# Patient Record
Sex: FEMALE | Race: WHITE | Employment: OTHER | URBAN - NONMETROPOLITAN AREA
[De-identification: names, ages, dates, MRNs, and addresses within clinical notes are randomized per-mention and may not be internally consistent; named-entity substitution may affect disease eponyms.]

---

## 2017-02-13 ENCOUNTER — OFFICE VISIT (OUTPATIENT)
Dept: CARDIOLOGY | Age: 77
End: 2017-02-13
Payer: MEDICARE

## 2017-02-13 VITALS
WEIGHT: 140 LBS | DIASTOLIC BLOOD PRESSURE: 64 MMHG | SYSTOLIC BLOOD PRESSURE: 124 MMHG | BODY MASS INDEX: 27.48 KG/M2 | HEIGHT: 60 IN | HEART RATE: 84 BPM

## 2017-02-13 DIAGNOSIS — I51.9 LEFT VENTRICULAR DYSFUNCTION: ICD-10-CM

## 2017-02-13 DIAGNOSIS — I35.1 AORTIC VALVE INSUFFICIENCY, UNSPECIFIED ETIOLOGY: Primary | ICD-10-CM

## 2017-02-13 DIAGNOSIS — I10 ESSENTIAL HYPERTENSION: ICD-10-CM

## 2017-02-13 DIAGNOSIS — I34.0 MITRAL VALVE INSUFFICIENCY, UNSPECIFIED ETIOLOGY: ICD-10-CM

## 2017-02-13 PROCEDURE — 1123F ACP DISCUSS/DSCN MKR DOCD: CPT | Performed by: NURSE PRACTITIONER

## 2017-02-13 PROCEDURE — G8420 CALC BMI NORM PARAMETERS: HCPCS | Performed by: NURSE PRACTITIONER

## 2017-02-13 PROCEDURE — 1036F TOBACCO NON-USER: CPT | Performed by: NURSE PRACTITIONER

## 2017-02-13 PROCEDURE — 4040F PNEUMOC VAC/ADMIN/RCVD: CPT | Performed by: NURSE PRACTITIONER

## 2017-02-13 PROCEDURE — 1090F PRES/ABSN URINE INCON ASSESS: CPT | Performed by: NURSE PRACTITIONER

## 2017-02-13 PROCEDURE — 99213 OFFICE O/P EST LOW 20 MIN: CPT | Performed by: NURSE PRACTITIONER

## 2017-02-13 PROCEDURE — G8484 FLU IMMUNIZE NO ADMIN: HCPCS | Performed by: NURSE PRACTITIONER

## 2017-02-13 PROCEDURE — G8427 DOCREV CUR MEDS BY ELIG CLIN: HCPCS | Performed by: NURSE PRACTITIONER

## 2017-02-13 PROCEDURE — G8400 PT W/DXA NO RESULTS DOC: HCPCS | Performed by: NURSE PRACTITIONER

## 2017-02-13 RX ORDER — OMEPRAZOLE 40 MG/1
40 CAPSULE, DELAYED RELEASE ORAL DAILY
COMMUNITY
Start: 2017-01-19

## 2017-08-14 ENCOUNTER — TELEPHONE (OUTPATIENT)
Dept: CARDIOLOGY | Age: 77
End: 2017-08-14

## 2017-08-14 ENCOUNTER — OFFICE VISIT (OUTPATIENT)
Dept: CARDIOLOGY | Age: 77
End: 2017-08-14
Payer: MEDICARE

## 2017-08-14 VITALS
HEIGHT: 60 IN | DIASTOLIC BLOOD PRESSURE: 78 MMHG | BODY MASS INDEX: 28.86 KG/M2 | SYSTOLIC BLOOD PRESSURE: 134 MMHG | WEIGHT: 147 LBS | HEART RATE: 88 BPM

## 2017-08-14 DIAGNOSIS — I34.0 MITRAL VALVE INSUFFICIENCY, UNSPECIFIED ETIOLOGY: Primary | ICD-10-CM

## 2017-08-14 DIAGNOSIS — I34.0 MODERATE MITRAL REGURGITATION: ICD-10-CM

## 2017-08-14 DIAGNOSIS — R06.09 DYSPNEA ON EXERTION: ICD-10-CM

## 2017-08-14 DIAGNOSIS — I07.1 MODERATE TRICUSPID REGURGITATION: ICD-10-CM

## 2017-08-14 DIAGNOSIS — I34.0 MITRAL VALVE INSUFFICIENCY, UNSPECIFIED ETIOLOGY: ICD-10-CM

## 2017-08-14 DIAGNOSIS — I10 ESSENTIAL HYPERTENSION: ICD-10-CM

## 2017-08-14 DIAGNOSIS — I35.1 MODERATE AORTIC INSUFFICIENCY: ICD-10-CM

## 2017-08-14 DIAGNOSIS — I38 VALVULAR HEART DISEASE: ICD-10-CM

## 2017-08-14 LAB
ANION GAP SERPL CALCULATED.3IONS-SCNC: 11 MMOL/L (ref 7–19)
BUN BLDV-MCNC: 15 MG/DL (ref 8–23)
CALCIUM SERPL-MCNC: 9 MG/DL (ref 8.8–10.2)
CHLORIDE BLD-SCNC: 108 MMOL/L (ref 98–111)
CO2: 25 MMOL/L (ref 22–29)
CREAT SERPL-MCNC: 0.5 MG/DL (ref 0.5–0.9)
GFR NON-AFRICAN AMERICAN: >60
GLUCOSE BLD-MCNC: 91 MG/DL (ref 74–109)
POTASSIUM SERPL-SCNC: 4.7 MMOL/L (ref 3.5–5)
PRO-BNP: 1045 PG/ML (ref 0–1800)
SODIUM BLD-SCNC: 144 MMOL/L (ref 136–145)

## 2017-08-14 PROCEDURE — G8419 CALC BMI OUT NRM PARAM NOF/U: HCPCS | Performed by: INTERNAL MEDICINE

## 2017-08-14 PROCEDURE — 99213 OFFICE O/P EST LOW 20 MIN: CPT | Performed by: INTERNAL MEDICINE

## 2017-08-14 PROCEDURE — 1123F ACP DISCUSS/DSCN MKR DOCD: CPT | Performed by: INTERNAL MEDICINE

## 2017-08-14 PROCEDURE — 1036F TOBACCO NON-USER: CPT | Performed by: INTERNAL MEDICINE

## 2017-08-14 PROCEDURE — 1090F PRES/ABSN URINE INCON ASSESS: CPT | Performed by: INTERNAL MEDICINE

## 2017-08-14 PROCEDURE — G8400 PT W/DXA NO RESULTS DOC: HCPCS | Performed by: INTERNAL MEDICINE

## 2017-08-14 PROCEDURE — G8427 DOCREV CUR MEDS BY ELIG CLIN: HCPCS | Performed by: INTERNAL MEDICINE

## 2017-08-14 PROCEDURE — 4040F PNEUMOC VAC/ADMIN/RCVD: CPT | Performed by: INTERNAL MEDICINE

## 2017-08-14 RX ORDER — TENOFOVIR DISOPROXIL FUMARATE 300 MG
300 TABLET ORAL DAILY
COMMUNITY
Start: 2017-07-17

## 2017-08-18 ENCOUNTER — HOSPITAL ENCOUNTER (OUTPATIENT)
Dept: NON INVASIVE DIAGNOSTICS | Age: 77
Discharge: HOME | End: 2017-08-18
Payer: MEDICARE

## 2017-08-18 DIAGNOSIS — I35.1 MODERATE AORTIC INSUFFICIENCY: ICD-10-CM

## 2017-08-18 DIAGNOSIS — I34.0 MODERATE MITRAL REGURGITATION: ICD-10-CM

## 2017-08-18 DIAGNOSIS — I07.1 MODERATE TRICUSPID REGURGITATION: ICD-10-CM

## 2017-08-18 DIAGNOSIS — I34.0 MITRAL VALVE INSUFFICIENCY, UNSPECIFIED ETIOLOGY: ICD-10-CM

## 2017-08-18 DIAGNOSIS — I38 VALVULAR HEART DISEASE: ICD-10-CM

## 2017-08-18 PROCEDURE — 93306 TTE W/DOPPLER COMPLETE: CPT

## 2017-08-29 ENCOUNTER — OFFICE VISIT (OUTPATIENT)
Dept: CARDIOLOGY | Age: 77
End: 2017-08-29
Payer: MEDICARE

## 2017-08-29 VITALS
WEIGHT: 145 LBS | HEART RATE: 80 BPM | DIASTOLIC BLOOD PRESSURE: 60 MMHG | BODY MASS INDEX: 28.47 KG/M2 | HEIGHT: 60 IN | SYSTOLIC BLOOD PRESSURE: 118 MMHG

## 2017-08-29 DIAGNOSIS — I10 ESSENTIAL HYPERTENSION: ICD-10-CM

## 2017-08-29 DIAGNOSIS — I51.89 DIASTOLIC DYSFUNCTION: ICD-10-CM

## 2017-08-29 DIAGNOSIS — I51.9 LEFT VENTRICULAR DYSFUNCTION: ICD-10-CM

## 2017-08-29 DIAGNOSIS — I35.1 MODERATE AORTIC INSUFFICIENCY: ICD-10-CM

## 2017-08-29 DIAGNOSIS — I34.0 MITRAL VALVE INSUFFICIENCY, UNSPECIFIED ETIOLOGY: ICD-10-CM

## 2017-08-29 DIAGNOSIS — R06.02 SOB (SHORTNESS OF BREATH): Primary | ICD-10-CM

## 2017-08-29 DIAGNOSIS — I50.42 CHRONIC COMBINED SYSTOLIC AND DIASTOLIC CHF (CONGESTIVE HEART FAILURE) (HCC): ICD-10-CM

## 2017-08-29 PROCEDURE — G8419 CALC BMI OUT NRM PARAM NOF/U: HCPCS | Performed by: NURSE PRACTITIONER

## 2017-08-29 PROCEDURE — 1123F ACP DISCUSS/DSCN MKR DOCD: CPT | Performed by: NURSE PRACTITIONER

## 2017-08-29 PROCEDURE — G8427 DOCREV CUR MEDS BY ELIG CLIN: HCPCS | Performed by: NURSE PRACTITIONER

## 2017-08-29 PROCEDURE — 4040F PNEUMOC VAC/ADMIN/RCVD: CPT | Performed by: NURSE PRACTITIONER

## 2017-08-29 PROCEDURE — 99214 OFFICE O/P EST MOD 30 MIN: CPT | Performed by: NURSE PRACTITIONER

## 2017-08-29 PROCEDURE — G8400 PT W/DXA NO RESULTS DOC: HCPCS | Performed by: NURSE PRACTITIONER

## 2017-08-29 PROCEDURE — 1036F TOBACCO NON-USER: CPT | Performed by: NURSE PRACTITIONER

## 2017-08-29 PROCEDURE — 1090F PRES/ABSN URINE INCON ASSESS: CPT | Performed by: NURSE PRACTITIONER

## 2017-08-29 PROCEDURE — 93000 ELECTROCARDIOGRAM COMPLETE: CPT | Performed by: NURSE PRACTITIONER

## 2017-08-29 RX ORDER — CARVEDILOL 3.12 MG/1
3.12 TABLET ORAL 2 TIMES DAILY WITH MEALS
Qty: 60 TABLET | Refills: 5 | Status: SHIPPED | OUTPATIENT
Start: 2017-08-29 | End: 2017-09-20 | Stop reason: SDUPTHER

## 2017-08-29 RX ORDER — FUROSEMIDE 80 MG
80 TABLET ORAL DAILY
COMMUNITY
Start: 2017-08-15 | End: 2017-09-20 | Stop reason: DRUGHIGH

## 2017-09-20 ENCOUNTER — HOSPITAL ENCOUNTER (OUTPATIENT)
Dept: GENERAL RADIOLOGY | Age: 77
Discharge: HOME | End: 2017-09-20
Payer: MEDICARE

## 2017-09-20 ENCOUNTER — OFFICE VISIT (OUTPATIENT)
Dept: CARDIOLOGY | Age: 77
End: 2017-09-20
Payer: MEDICARE

## 2017-09-20 VITALS
DIASTOLIC BLOOD PRESSURE: 72 MMHG | HEART RATE: 70 BPM | BODY MASS INDEX: 28.27 KG/M2 | HEIGHT: 60 IN | WEIGHT: 144 LBS | SYSTOLIC BLOOD PRESSURE: 130 MMHG

## 2017-09-20 DIAGNOSIS — R09.89 LUNG CRACKLES: ICD-10-CM

## 2017-09-20 DIAGNOSIS — I10 ESSENTIAL HYPERTENSION: Primary | ICD-10-CM

## 2017-09-20 DIAGNOSIS — R06.09 DOE (DYSPNEA ON EXERTION): ICD-10-CM

## 2017-09-20 DIAGNOSIS — I50.42 CHRONIC COMBINED SYSTOLIC AND DIASTOLIC CHF (CONGESTIVE HEART FAILURE) (HCC): ICD-10-CM

## 2017-09-20 LAB
ANION GAP SERPL CALCULATED.3IONS-SCNC: 15 MMOL/L (ref 7–19)
BASOPHILS ABSOLUTE: 0 K/UL (ref 0–0.2)
BASOPHILS RELATIVE PERCENT: 0.2 % (ref 0–1)
BUN BLDV-MCNC: 15 MG/DL (ref 8–23)
CALCIUM SERPL-MCNC: 9.3 MG/DL (ref 8.8–10.2)
CHLORIDE BLD-SCNC: 104 MMOL/L (ref 98–111)
CO2: 25 MMOL/L (ref 22–29)
CREAT SERPL-MCNC: 0.5 MG/DL (ref 0.5–0.9)
EOSINOPHILS ABSOLUTE: 0.1 K/UL (ref 0–0.6)
EOSINOPHILS RELATIVE PERCENT: 1.1 % (ref 0–5)
GFR NON-AFRICAN AMERICAN: >60
GLUCOSE BLD-MCNC: 97 MG/DL (ref 74–109)
HCT VFR BLD CALC: 38 % (ref 37–47)
HEMOGLOBIN: 12.3 G/DL (ref 12–16)
LYMPHOCYTES ABSOLUTE: 1.2 K/UL (ref 1.1–4.5)
LYMPHOCYTES RELATIVE PERCENT: 26.5 % (ref 20–40)
MCH RBC QN AUTO: 31.6 PG (ref 27–31)
MCHC RBC AUTO-ENTMCNC: 32.4 G/DL (ref 33–37)
MCV RBC AUTO: 97.7 FL (ref 81–99)
MONOCYTES ABSOLUTE: 0.5 K/UL (ref 0–0.9)
MONOCYTES RELATIVE PERCENT: 11.9 % (ref 0–10)
NEUTROPHILS ABSOLUTE: 2.6 K/UL (ref 1.5–7.5)
NEUTROPHILS RELATIVE PERCENT: 59.6 % (ref 50–65)
PDW BLD-RTO: 14.2 % (ref 11.5–14.5)
PLATELET # BLD: 80 K/UL (ref 130–400)
PMV BLD AUTO: 12.2 FL (ref 9.4–12.3)
POTASSIUM SERPL-SCNC: 4 MMOL/L (ref 3.5–5)
PRO-BNP: 612 PG/ML (ref 0–1800)
RBC # BLD: 3.89 M/UL (ref 4.2–5.4)
SODIUM BLD-SCNC: 144 MMOL/L (ref 136–145)
WBC # BLD: 4.4 K/UL (ref 4.8–10.8)

## 2017-09-20 PROCEDURE — G8427 DOCREV CUR MEDS BY ELIG CLIN: HCPCS | Performed by: INTERNAL MEDICINE

## 2017-09-20 PROCEDURE — 71020 XR CHEST STANDARD TWO VW: CPT

## 2017-09-20 PROCEDURE — 99214 OFFICE O/P EST MOD 30 MIN: CPT | Performed by: INTERNAL MEDICINE

## 2017-09-20 PROCEDURE — G8400 PT W/DXA NO RESULTS DOC: HCPCS | Performed by: INTERNAL MEDICINE

## 2017-09-20 PROCEDURE — 4040F PNEUMOC VAC/ADMIN/RCVD: CPT | Performed by: INTERNAL MEDICINE

## 2017-09-20 PROCEDURE — 93000 ELECTROCARDIOGRAM COMPLETE: CPT | Performed by: INTERNAL MEDICINE

## 2017-09-20 PROCEDURE — G8419 CALC BMI OUT NRM PARAM NOF/U: HCPCS | Performed by: INTERNAL MEDICINE

## 2017-09-20 PROCEDURE — 1090F PRES/ABSN URINE INCON ASSESS: CPT | Performed by: INTERNAL MEDICINE

## 2017-09-20 PROCEDURE — 1123F ACP DISCUSS/DSCN MKR DOCD: CPT | Performed by: INTERNAL MEDICINE

## 2017-09-20 PROCEDURE — 1036F TOBACCO NON-USER: CPT | Performed by: INTERNAL MEDICINE

## 2017-09-20 RX ORDER — CARVEDILOL 6.25 MG/1
6.25 TABLET ORAL 2 TIMES DAILY WITH MEALS
Qty: 30 TABLET | Refills: 5 | Status: SHIPPED | OUTPATIENT
Start: 2017-09-20 | End: 2018-01-08 | Stop reason: SDUPTHER

## 2017-09-20 RX ORDER — FUROSEMIDE 40 MG/1
40 TABLET ORAL DAILY
COMMUNITY
End: 2022-09-26

## 2017-09-20 ASSESSMENT — ENCOUNTER SYMPTOMS: SHORTNESS OF BREATH: 1

## 2017-11-01 ENCOUNTER — OFFICE VISIT (OUTPATIENT)
Dept: CARDIOLOGY | Age: 77
End: 2017-11-01
Payer: MEDICARE

## 2017-11-01 VITALS
SYSTOLIC BLOOD PRESSURE: 124 MMHG | WEIGHT: 145 LBS | HEART RATE: 64 BPM | BODY MASS INDEX: 28.47 KG/M2 | HEIGHT: 60 IN | DIASTOLIC BLOOD PRESSURE: 64 MMHG

## 2017-11-01 DIAGNOSIS — I10 ESSENTIAL HYPERTENSION: Primary | ICD-10-CM

## 2017-11-01 DIAGNOSIS — I34.0 MODERATE MITRAL REGURGITATION: ICD-10-CM

## 2017-11-01 DIAGNOSIS — I35.1 NONRHEUMATIC AORTIC VALVE INSUFFICIENCY: ICD-10-CM

## 2017-11-01 DIAGNOSIS — I07.1 MODERATE TRICUSPID REGURGITATION: ICD-10-CM

## 2017-11-01 DIAGNOSIS — I50.42 CHRONIC COMBINED SYSTOLIC AND DIASTOLIC CHF (CONGESTIVE HEART FAILURE) (HCC): ICD-10-CM

## 2017-11-01 PROCEDURE — G8400 PT W/DXA NO RESULTS DOC: HCPCS | Performed by: NURSE PRACTITIONER

## 2017-11-01 PROCEDURE — 1036F TOBACCO NON-USER: CPT | Performed by: NURSE PRACTITIONER

## 2017-11-01 PROCEDURE — 93000 ELECTROCARDIOGRAM COMPLETE: CPT | Performed by: NURSE PRACTITIONER

## 2017-11-01 PROCEDURE — G8427 DOCREV CUR MEDS BY ELIG CLIN: HCPCS | Performed by: NURSE PRACTITIONER

## 2017-11-01 PROCEDURE — 1090F PRES/ABSN URINE INCON ASSESS: CPT | Performed by: NURSE PRACTITIONER

## 2017-11-01 PROCEDURE — 4040F PNEUMOC VAC/ADMIN/RCVD: CPT | Performed by: NURSE PRACTITIONER

## 2017-11-01 PROCEDURE — G8419 CALC BMI OUT NRM PARAM NOF/U: HCPCS | Performed by: NURSE PRACTITIONER

## 2017-11-01 PROCEDURE — G8484 FLU IMMUNIZE NO ADMIN: HCPCS | Performed by: NURSE PRACTITIONER

## 2017-11-01 PROCEDURE — 1123F ACP DISCUSS/DSCN MKR DOCD: CPT | Performed by: NURSE PRACTITIONER

## 2017-11-01 PROCEDURE — 99213 OFFICE O/P EST LOW 20 MIN: CPT | Performed by: NURSE PRACTITIONER

## 2017-11-01 NOTE — PROGRESS NOTES
Constitutional: Negative for fever, chills, diaphoresis, activity change, appetite change, fatigue and unexpected weight change. Eyes: Negative for photophobia, pain, redness and visual disturbance. Respiratory: Negative for apnea, cough, chest tightness, +improved shortness of breath, wheezing and stridor. Cardiovascular: Negative for chest pain, palpitations and + LUE swelling. Gastrointestinal: Negative for abdominal distention. Genitourinary: Negative for dysuria, urgency and frequency. Musculoskeletal: Negative for myalgias, arthralgias and gait problem. Skin: Negative for color change, pallor, rash and wound. Neurological: Negative for dizziness, tremors, speech difficulty, weakness and numbness. Hematological: Does not bruise/bleed easily. Psychiatric/Behavioral: Negative. Past Medical History:   Diagnosis Date    Aortic insufficiency 12/04/13    Mild to moderate aortic insufficency.  Carcinoid tumor of ovary     [facility]     Diabetes ([facility]     Essential hypertension 8/29/2017    H/O: hysterectomy     Hx of breast cancer     History of bilateral breast cancer.  Hypertension     Incisional hernia with obstruction     with mesh placement    Moderate aortic insufficiency 02/17/2016    Moderate mitral regurgitation 02/17/2016    Moderate to severe mitral regurgitation.  Moderate tricuspid regurgitation 02/17/2016    Ovarian cyst     Pelvic mass     Radiation     Skin cancer        Past Surgical History:   Procedure Laterality Date    BREAST SURGERY      CARDIAC CATHETERIZATION  12/4/13  [facility]    EF 60%    HERNIA REPAIR      HYSTERECTOMY, TOTAL ABDOMINAL  12/2003    [facility] (negative for cancer). Family History   Problem Relation Age of Onset   Gin Barekai Cancer Son      brain tumor    Heart Disease Mother     Diabetes Mother     Heart Disease Brother        Social History     Social History    Marital status:       Spouse name: N/A   Gin Reynolds Number of children: N/A    Years of education: N/A     Occupational History    Not on file. Social History Main Topics    Smoking status: Never Smoker    Smokeless tobacco: Not on file    Alcohol use No    Drug use: No    Sexual activity: Not on file     Other Topics Concern    Not on file     Social History Narrative    No narrative on file       No Known Allergies      Current Outpatient Prescriptions:     furosemide (LASIX) 40 MG tablet, Take 40 mg by mouth daily, Disp: , Rfl:     carvedilol (COREG) 6.25 MG tablet, Take 1 tablet by mouth 2 times daily (with meals), Disp: 30 tablet, Rfl: 5    VIREAD 300 MG tablet, Take 300 mg by mouth daily , Disp: , Rfl:     omeprazole (PRILOSEC) 40 MG delayed release capsule, , Disp: , Rfl:     losartan (COZAAR) 100 MG tablet, Take 100 mg by mouth daily. , Disp: , Rfl:     HYDROcodone-acetaminophen (NORCO)  MG per tablet, Take 1 tablet by mouth every 8 hours as needed , Disp: , Rfl:     amitriptyline (ELAVIL) 25 MG tablet, Take 25 mg by mouth nightly as needed. , Disp: , Rfl:     cholestyramine (QUESTRAN) 4 G packet, Take 4 g by mouth daily. , Disp: , Rfl:     PE:  Vitals:    11/01/17 0948   BP: 124/64   Pulse: 64       Estimated body mass index is 28.32 kg/m² as calculated from the following:    Height as of this encounter: 5' (1.524 m). Weight as of this encounter: 145 lb (65.8 kg). Constitutional: She is oriented to person, place, and time. She appears well-developed and well-nourished in no acute distress. Head: Normocephalic and atraumatic. Neck:  Neck supple without JVD present. Cardiovascular: Normal rate, regular rhythm, normal heart sounds. 2/6 TASNEEM ascultated. No gallop and no friction rub.  no carotid bruits. LUE peripheral edema. Pulmonary/Chest:  Lungs clear to auscultation bilaterally without evidence of respiratory distress. She without wheezes. She without rales or ronchi. Musculoskeletal: Normal range of motion.   Gait is normal no assitive device. Neurological: She is alert and oriented to person, place, and time. Skin: Skin is warm and dry without rash or pallor. Psychiatric: She has a normal mood and affect. Her behavior is normal. Thought content normal.     Lab Results   Component Value Date    CREATININE 0.5 09/20/2017    CREATININE 0.5 08/14/2017    HGB 12.3 09/20/2017    PROBNP 612 09/20/2017    PROBNP 1,045 08/14/2017       ECG 11/01/17  Normal sinus rhythm, Poor R wave progression and 72 BPM    TTE   Summary   Moderately thickened mitral valve with moderately reduced leaflet   mobility. No evidence of mitral valve stenosis, moderate to severe mitral   regurgitation.   The aortic valve is trileaflet, moderately thickened & calcified with good   leaflet separation.   Moderate aortic regurgitation is noted.   Left ventricular size is normal .   Mild concentric left ventricular hypertrophy.   Moderate to severely decreased ejection fraction.      Signature      ----------------------------------------------------------------   Electronically signed by Varghese Herrera MD(Interpreting   physician) on 08/18/2017 03:07 PM      Cath    Assessment    1. Essential hypertension    2. Moderate tricuspid regurgitation    3. Moderate mitral regurgitation    4. Chronic combined systolic and diastolic CHF (congestive heart failure) ([address]    5. Nonrheumatic aortic valve insufficiency          Plan:    Continue current medications as prescribed. Stable Cardiovascular status    Disposition - RTC in 3 months with Dr. Anya Peacock for Valve follow up or sooner if needed  She will need a repeat Echo in June-Aug 2018    Please do not hesitate to contact me for any questions or concerns.     Sincerely yours,    FARZAD Daniels

## 2018-01-08 DIAGNOSIS — I50.42 CHRONIC COMBINED SYSTOLIC AND DIASTOLIC CHF (CONGESTIVE HEART FAILURE) (HCC): ICD-10-CM

## 2018-01-08 DIAGNOSIS — R06.09 DOE (DYSPNEA ON EXERTION): ICD-10-CM

## 2018-01-08 DIAGNOSIS — R09.89 LUNG CRACKLES: ICD-10-CM

## 2018-01-08 RX ORDER — CARVEDILOL 6.25 MG/1
TABLET ORAL
Qty: 30 TABLET | Refills: 5 | Status: SHIPPED | OUTPATIENT
Start: 2018-01-08 | End: 2018-04-02 | Stop reason: DRUGHIGH

## 2018-02-06 ENCOUNTER — OFFICE VISIT (OUTPATIENT)
Dept: CARDIOLOGY | Age: 78
End: 2018-02-06
Payer: MEDICARE

## 2018-02-06 VITALS
BODY MASS INDEX: 29.45 KG/M2 | WEIGHT: 150 LBS | HEART RATE: 74 BPM | DIASTOLIC BLOOD PRESSURE: 70 MMHG | SYSTOLIC BLOOD PRESSURE: 130 MMHG | HEIGHT: 60 IN

## 2018-02-06 DIAGNOSIS — R06.02 SHORTNESS OF BREATH: ICD-10-CM

## 2018-02-06 DIAGNOSIS — I51.89 DIASTOLIC DYSFUNCTION: ICD-10-CM

## 2018-02-06 DIAGNOSIS — I35.1 NONRHEUMATIC AORTIC VALVE INSUFFICIENCY: Primary | ICD-10-CM

## 2018-02-06 DIAGNOSIS — I51.9 LEFT VENTRICULAR DYSFUNCTION: ICD-10-CM

## 2018-02-06 DIAGNOSIS — I34.0 MODERATE MITRAL REGURGITATION: ICD-10-CM

## 2018-02-06 DIAGNOSIS — R06.02 SOB (SHORTNESS OF BREATH): ICD-10-CM

## 2018-02-06 DIAGNOSIS — I50.22 CHRONIC SYSTOLIC HEART FAILURE (HCC): ICD-10-CM

## 2018-02-06 DIAGNOSIS — I50.42 CHRONIC COMBINED SYSTOLIC AND DIASTOLIC CHF (CONGESTIVE HEART FAILURE) (HCC): ICD-10-CM

## 2018-02-06 DIAGNOSIS — I07.1 MODERATE TRICUSPID REGURGITATION: ICD-10-CM

## 2018-02-06 PROCEDURE — G8400 PT W/DXA NO RESULTS DOC: HCPCS | Performed by: NURSE PRACTITIONER

## 2018-02-06 PROCEDURE — G8419 CALC BMI OUT NRM PARAM NOF/U: HCPCS | Performed by: NURSE PRACTITIONER

## 2018-02-06 PROCEDURE — 93000 ELECTROCARDIOGRAM COMPLETE: CPT | Performed by: NURSE PRACTITIONER

## 2018-02-06 PROCEDURE — 1090F PRES/ABSN URINE INCON ASSESS: CPT | Performed by: NURSE PRACTITIONER

## 2018-02-06 PROCEDURE — G8484 FLU IMMUNIZE NO ADMIN: HCPCS | Performed by: NURSE PRACTITIONER

## 2018-02-06 PROCEDURE — 99213 OFFICE O/P EST LOW 20 MIN: CPT | Performed by: NURSE PRACTITIONER

## 2018-02-06 PROCEDURE — 1123F ACP DISCUSS/DSCN MKR DOCD: CPT | Performed by: NURSE PRACTITIONER

## 2018-02-06 PROCEDURE — 1036F TOBACCO NON-USER: CPT | Performed by: NURSE PRACTITIONER

## 2018-02-06 PROCEDURE — 4040F PNEUMOC VAC/ADMIN/RCVD: CPT | Performed by: NURSE PRACTITIONER

## 2018-02-06 PROCEDURE — G8427 DOCREV CUR MEDS BY ELIG CLIN: HCPCS | Performed by: NURSE PRACTITIONER

## 2018-02-06 NOTE — PROGRESS NOTES
Dear Jes Urban DO & Mimi Ornelas DO,    Thank you for allowing me to participate in the care of Ms. Jesus Hair. She presents today at the [address] [place] [address] in the [facility]. As you know, Ms. Dari Bond is a [de-identified] y.o. female with history of hypertension, hx of Ovarian Ca, Colon Ca, Breast Ca, Diabetes, TR, MR, aortic insufficiency, Diastolic Dysfunction, Left Ventricular Dysfunction and  Chronic combined systolic diastolic CHF who presents with the chief complaint of 3 month follow up. He is a patient of Dr. Ranjeet Urbina and Dr. Adriane Clifford. Congestive Heart Failure  Echo 2015 EF 30%, Echo Feb 2016 EF 35%, Cath 2013 free of occlusive disease, ECHO 8/2017 EF 25%, Mod-Severe MR, mod AR. Patient presents for re-evaluation of congestive heart failure. Patient's current complaints are dyspnea and fatigue. She denies chest pain, exertional chest pressure/discomfort, near-syncope, orthopnea, palpitations, paroxysmal nocturnal dyspnea and syncope. She states she is compliant most of the time with her medications. She states she is compliant most of the time with her diet. Last couple of weeks tired again. Breathless with just getting from office to car. Feels like her ROMERO/ SOA is worse. She has been weighing daily- has gained weight goes from 139-140 now 142. 3 months ago she stated her weights run from 139-144. Tries to watch low salt diet. She has been taking 80 mg of Lasix for the last few days. Hypertension  Patient is here for follow-up of elevated blood pressure. She is not exercising and is adherent to a low-salt diet. Blood pressure is well controlled at home. Cardiac symptoms: dyspnea, fatigue and lower extremity edema. Patient denies chest pain, exertional chest pressure/discomfort, irregular heart beat, near-syncope, orthopnea, palpitations, paroxysmal nocturnal dyspnea and syncope.  Cardiovascular risk factors: advanced age (older than 54 for men, 72 for women), diabetes mellitus, family history of premature cardiovascular disease and hypertension. Use of agents associated with hypertension: none. History of target organ damage: heart failure and peripheral artery disease. BS running a bit higher lately as well. She has has some medication adjustments with her PCP. She otherwise denies chest pain, PND, orthopnea, syncope or near syncope. She has no other complaints. Review of Systems    Constitutional: Negative for fever, chills, diaphoresis, activity change, appetite change, fatigue and unexpected weight change. Eyes: Negative for photophobia, pain, redness and visual disturbance. Respiratory: Negative for apnea, cough, chest tightness, +shortness of breath ROMERO, no wheezing and stridor. Cardiovascular: Negative for chest pain, palpitations and leg swelling. Gastrointestinal: Negative for abdominal distention. Genitourinary: Negative for dysuria, urgency and frequency. Musculoskeletal: Negative for myalgias, arthralgias and gait problem. Skin: Negative for color change, pallor, rash and wound. Neurological: Negative for dizziness, tremors, speech difficulty, weakness and numbness. Hematological: Does not bruise/bleed easily. Psychiatric/Behavioral: Negative. Past Medical History:   Diagnosis Date    Aortic insufficiency 12/04/13    Mild to moderate aortic insufficency.  Carcinoid tumor of ovary     [facility]     Diabetes ([facility]     Essential hypertension 8/29/2017    H/O: hysterectomy     Hepatitis B     Hx of breast cancer     History of bilateral breast cancer.  Hypertension     Incisional hernia with obstruction     with mesh placement    Moderate aortic insufficiency 02/17/2016    Moderate mitral regurgitation 02/17/2016    Moderate to severe mitral regurgitation.      Moderate tricuspid regurgitation 02/17/2016    Ovarian cyst     Pelvic mass     Radiation    

## 2018-02-06 NOTE — PATIENT INSTRUCTIONS
says 50%, it means that you will get at least 50% of all the sodium you need for the entire day in one serving. Choose products with low Percent Daily Values for sodium. · Be aware that sodium can come in forms other than salt, including monosodium glutamate (MSG), sodium citrate, and sodium bicarbonate (baking soda). MSG is often added to Asian food. You can sometimes ask for food without MSG or salt. Buy low-sodium foods  · Buy foods that are labeled \"unsalted\" (no salt added), \"sodium-free\" (less than 5 mg of sodium per serving), or \"low-sodium\" (less than 140 mg of sodium per serving). A food labeled \"light sodium\" has less than half of the full-sodium version of that food. Foods labeled \"reduced-sodium\" may still have too much sodium. · Buy fresh vegetables or plain, frozen vegetables. Buy low-sodium versions of canned vegetables, soups, and other canned goods. Prepare low-sodium meals  · Use less salt each day when cooking. Reducing salt in this way will help you adjust to the taste. Do not add salt after cooking. Take the salt shaker off the table. · Flavor your food with garlic, lemon juice, onion, vinegar, herbs, and spices instead of salt. Do not use soy sauce, steak sauce, onion salt, garlic salt, mustard, or ketchup on your food. · Make your own salad dressings, sauces, and ketchup without adding salt. · Use less salt (or none) when recipes call for it. You can often use half the salt a recipe calls for without losing flavor. Other dishes like rice, pasta, and grains do not need added salt. · Rinse canned vegetables. This removes some-but not all-of the salt. · Avoid water that has a naturally high sodium content or that has been treated with water softeners, which add sodium. Call your local water company to find out the sodium content of your water supply. If you buy bottled water, read the label and choose a sodium-free brand.   Avoid high-sodium foods, such as:  · Smoked, cured, salted, and

## 2018-02-19 ENCOUNTER — HOSPITAL ENCOUNTER (OUTPATIENT)
Dept: NON INVASIVE DIAGNOSTICS | Age: 78
Discharge: HOME | End: 2018-02-19
Payer: MEDICARE

## 2018-02-19 DIAGNOSIS — I34.0 MODERATE MITRAL REGURGITATION: ICD-10-CM

## 2018-02-19 DIAGNOSIS — I35.1 NONRHEUMATIC AORTIC VALVE INSUFFICIENCY: ICD-10-CM

## 2018-02-19 DIAGNOSIS — I50.22 CHRONIC SYSTOLIC HEART FAILURE (HCC): ICD-10-CM

## 2018-02-19 DIAGNOSIS — R06.02 SHORTNESS OF BREATH: ICD-10-CM

## 2018-02-19 LAB
LV EF: 58 %
LVEF MODALITY: NORMAL

## 2018-02-19 PROCEDURE — 93306 TTE W/DOPPLER COMPLETE: CPT

## 2018-02-23 DIAGNOSIS — R06.02 SOB (SHORTNESS OF BREATH): Primary | ICD-10-CM

## 2018-02-28 ENCOUNTER — HOSPITAL ENCOUNTER (OUTPATIENT)
Dept: PULMONOLOGY | Age: 78
Discharge: HOME | End: 2018-02-28
Payer: MEDICARE

## 2018-02-28 DIAGNOSIS — R06.02 SOB (SHORTNESS OF BREATH): ICD-10-CM

## 2018-02-28 PROCEDURE — 94060 EVALUATION OF WHEEZING: CPT

## 2018-02-28 PROCEDURE — 94729 DIFFUSING CAPACITY: CPT

## 2018-02-28 PROCEDURE — 94726 PLETHYSMOGRAPHY LUNG VOLUMES: CPT

## 2018-02-28 RX ORDER — ALBUTEROL SULFATE 2.5 MG/3ML
2.5 SOLUTION RESPIRATORY (INHALATION) EVERY 6 HOURS PRN
Status: DISCONTINUED | OUTPATIENT
Start: 2018-02-28 | End: 2018-03-02 | Stop reason: HOSPADM

## 2018-04-02 ENCOUNTER — OFFICE VISIT (OUTPATIENT)
Dept: CARDIOLOGY | Age: 78
End: 2018-04-02
Payer: MEDICARE

## 2018-04-02 VITALS
WEIGHT: 146 LBS | DIASTOLIC BLOOD PRESSURE: 76 MMHG | HEART RATE: 77 BPM | HEIGHT: 60 IN | BODY MASS INDEX: 28.66 KG/M2 | SYSTOLIC BLOOD PRESSURE: 130 MMHG

## 2018-04-02 DIAGNOSIS — I35.1 MODERATE AORTIC INSUFFICIENCY: ICD-10-CM

## 2018-04-02 DIAGNOSIS — I38 VALVULAR HEART DISEASE: ICD-10-CM

## 2018-04-02 DIAGNOSIS — I34.0 MODERATE MITRAL REGURGITATION: ICD-10-CM

## 2018-04-02 DIAGNOSIS — I50.42 CHRONIC COMBINED SYSTOLIC AND DIASTOLIC CONGESTIVE HEART FAILURE (HCC): Primary | ICD-10-CM

## 2018-04-02 PROCEDURE — G8427 DOCREV CUR MEDS BY ELIG CLIN: HCPCS | Performed by: INTERNAL MEDICINE

## 2018-04-02 PROCEDURE — G8400 PT W/DXA NO RESULTS DOC: HCPCS | Performed by: INTERNAL MEDICINE

## 2018-04-02 PROCEDURE — G8419 CALC BMI OUT NRM PARAM NOF/U: HCPCS | Performed by: INTERNAL MEDICINE

## 2018-04-02 PROCEDURE — 4040F PNEUMOC VAC/ADMIN/RCVD: CPT | Performed by: INTERNAL MEDICINE

## 2018-04-02 PROCEDURE — 1090F PRES/ABSN URINE INCON ASSESS: CPT | Performed by: INTERNAL MEDICINE

## 2018-04-02 PROCEDURE — 1036F TOBACCO NON-USER: CPT | Performed by: INTERNAL MEDICINE

## 2018-04-02 PROCEDURE — 99213 OFFICE O/P EST LOW 20 MIN: CPT | Performed by: INTERNAL MEDICINE

## 2018-04-02 PROCEDURE — 1123F ACP DISCUSS/DSCN MKR DOCD: CPT | Performed by: INTERNAL MEDICINE

## 2018-04-02 RX ORDER — CARVEDILOL 25 MG/1
25 TABLET ORAL 2 TIMES DAILY WITH MEALS
COMMUNITY
End: 2018-04-05 | Stop reason: SDUPTHER

## 2018-04-02 RX ORDER — CLONIDINE HYDROCHLORIDE 0.1 MG/1
0.1 TABLET ORAL NIGHTLY
COMMUNITY
End: 2018-04-05 | Stop reason: SDUPTHER

## 2018-04-05 RX ORDER — CARVEDILOL 25 MG/1
25 TABLET ORAL 2 TIMES DAILY WITH MEALS
Qty: 60 TABLET | Refills: 3 | Status: SHIPPED | OUTPATIENT
Start: 2018-04-05 | End: 2018-07-05 | Stop reason: SDUPTHER

## 2018-04-05 RX ORDER — CLONIDINE HYDROCHLORIDE 0.1 MG/1
0.1 TABLET ORAL NIGHTLY
Qty: 60 TABLET | Refills: 3 | Status: SHIPPED | OUTPATIENT
Start: 2018-04-05

## 2018-05-07 ENCOUNTER — OFFICE VISIT (OUTPATIENT)
Dept: CARDIOLOGY | Age: 78
End: 2018-05-07
Payer: MEDICARE

## 2018-05-07 VITALS
HEART RATE: 64 BPM | DIASTOLIC BLOOD PRESSURE: 68 MMHG | BODY MASS INDEX: 28.86 KG/M2 | SYSTOLIC BLOOD PRESSURE: 128 MMHG | WEIGHT: 147 LBS | HEIGHT: 60 IN

## 2018-05-07 DIAGNOSIS — I34.0 NON-RHEUMATIC MITRAL REGURGITATION: ICD-10-CM

## 2018-05-07 DIAGNOSIS — I10 ESSENTIAL HYPERTENSION: Primary | ICD-10-CM

## 2018-05-07 DIAGNOSIS — I35.1 MODERATE AORTIC INSUFFICIENCY: ICD-10-CM

## 2018-05-07 PROCEDURE — 1036F TOBACCO NON-USER: CPT | Performed by: INTERNAL MEDICINE

## 2018-05-07 PROCEDURE — G8427 DOCREV CUR MEDS BY ELIG CLIN: HCPCS | Performed by: INTERNAL MEDICINE

## 2018-05-07 PROCEDURE — 1090F PRES/ABSN URINE INCON ASSESS: CPT | Performed by: INTERNAL MEDICINE

## 2018-05-07 PROCEDURE — 4040F PNEUMOC VAC/ADMIN/RCVD: CPT | Performed by: INTERNAL MEDICINE

## 2018-05-07 PROCEDURE — G8419 CALC BMI OUT NRM PARAM NOF/U: HCPCS | Performed by: INTERNAL MEDICINE

## 2018-05-07 PROCEDURE — 1123F ACP DISCUSS/DSCN MKR DOCD: CPT | Performed by: INTERNAL MEDICINE

## 2018-05-07 PROCEDURE — 99214 OFFICE O/P EST MOD 30 MIN: CPT | Performed by: INTERNAL MEDICINE

## 2018-05-07 PROCEDURE — 93000 ELECTROCARDIOGRAM COMPLETE: CPT | Performed by: INTERNAL MEDICINE

## 2018-05-07 PROCEDURE — G8400 PT W/DXA NO RESULTS DOC: HCPCS | Performed by: INTERNAL MEDICINE

## 2018-07-06 RX ORDER — CARVEDILOL 25 MG/1
TABLET ORAL
Qty: 60 TABLET | Refills: 5 | Status: SHIPPED | OUTPATIENT
Start: 2018-07-06

## 2018-10-04 ENCOUNTER — OFFICE VISIT (OUTPATIENT)
Dept: CARDIOLOGY | Age: 78
End: 2018-10-04
Payer: MEDICARE

## 2018-10-04 VITALS
WEIGHT: 147 LBS | HEART RATE: 60 BPM | HEIGHT: 60 IN | BODY MASS INDEX: 28.86 KG/M2 | DIASTOLIC BLOOD PRESSURE: 64 MMHG | SYSTOLIC BLOOD PRESSURE: 122 MMHG

## 2018-10-04 DIAGNOSIS — I10 ESSENTIAL HYPERTENSION: Primary | ICD-10-CM

## 2018-10-04 DIAGNOSIS — I51.9 LEFT VENTRICULAR DYSFUNCTION: ICD-10-CM

## 2018-10-04 DIAGNOSIS — I51.89 DIASTOLIC DYSFUNCTION: ICD-10-CM

## 2018-10-04 DIAGNOSIS — I34.0 MODERATE MITRAL REGURGITATION: ICD-10-CM

## 2018-10-04 DIAGNOSIS — I35.1 MODERATE AORTIC INSUFFICIENCY: ICD-10-CM

## 2018-10-04 PROCEDURE — 99214 OFFICE O/P EST MOD 30 MIN: CPT | Performed by: NURSE PRACTITIONER

## 2018-10-04 PROCEDURE — G8419 CALC BMI OUT NRM PARAM NOF/U: HCPCS | Performed by: NURSE PRACTITIONER

## 2018-10-04 PROCEDURE — G8427 DOCREV CUR MEDS BY ELIG CLIN: HCPCS | Performed by: NURSE PRACTITIONER

## 2018-10-04 PROCEDURE — G8484 FLU IMMUNIZE NO ADMIN: HCPCS | Performed by: NURSE PRACTITIONER

## 2018-10-04 PROCEDURE — 1090F PRES/ABSN URINE INCON ASSESS: CPT | Performed by: NURSE PRACTITIONER

## 2018-10-04 PROCEDURE — 1101F PT FALLS ASSESS-DOCD LE1/YR: CPT | Performed by: NURSE PRACTITIONER

## 2018-10-04 NOTE — PROGRESS NOTES
[facility], [place]. [address], [facility], [address]  [phone] office  [phone] fax      OFFICE VISIT:  10/4/2018    Maria Del Carmen Holman - : 1940    Reason For Visit:  Gabby Reddy is a [de-identified] y.o. female who is here for 6 Month Follow-Up (Patient is having SOB and a lot of swelling in left arm.) and Hypertension    The patient presents today for cardiology follow up. Overall, the patient is doing well from a cardiac standpoint without symptoms to suggest myocardial ischemia. The patient reports feeling more SOA the past week. No cough or fever. She reports weight fluctuates. Weight is stable at 147 per EPIC flowsheet. PFTs were done this past February which showed mid flows reduced with improvement post bronchodilator.  cath showed no occlusive CAD. The patient has a hx of left ventricular dysfunction with normalization of EF on 2018 echo. She is has been following with Dr. William Nicohls for moderate MR and AI. BP is well controlled on current regimen. The patient's PCP monitors cholesterol. Patient following with Dr. Roque Linda at this time for B12 injections. Patient reports hx of breast cancer x 2, colon and ovarian cancer. Subjective  Gabby Reddy denies exertional chest pain, orthopnea, paroxysmal nocturnal dyspnea, syncope, presyncope, sustained arrhythmia and fatigue. The patient denies numbness or weakness to suggest cerebrovascular accident or transient ischemic attack. + more SOA for one week. +chronic LUE secondary to lymphedema.  + mild ankle edema.     Maria Del Carmen Holman has the following history as recorded in [facility]:    Patient Active Problem List   Diagnosis Code    [facility] C80.1    Pelvic mass R19.00    Hypertension I10    Aortic insufficiency I35.1    Left ventricular dysfunction I51.9    Moderate mitral regurgitation I34.0    Moderate aortic insufficiency I35.1    Moderate tricuspid regurgitation I07.1   

## 2019-03-13 ENCOUNTER — OFFICE VISIT (OUTPATIENT)
Dept: CARDIOLOGY | Age: 79
End: 2019-03-13
Payer: MEDICARE

## 2019-03-13 ENCOUNTER — HOSPITAL ENCOUNTER (OUTPATIENT)
Dept: NON INVASIVE DIAGNOSTICS | Age: 79
Discharge: HOME | End: 2019-03-13
Payer: MEDICARE

## 2019-03-13 VITALS
DIASTOLIC BLOOD PRESSURE: 58 MMHG | BODY MASS INDEX: 29.64 KG/M2 | SYSTOLIC BLOOD PRESSURE: 96 MMHG | WEIGHT: 151 LBS | HEIGHT: 60 IN | HEART RATE: 64 BPM

## 2019-03-13 DIAGNOSIS — R06.09 DOE (DYSPNEA ON EXERTION): Primary | ICD-10-CM

## 2019-03-13 DIAGNOSIS — I50.42 CHRONIC COMBINED SYSTOLIC AND DIASTOLIC HEART FAILURE (HCC): ICD-10-CM

## 2019-03-13 DIAGNOSIS — I10 ESSENTIAL HYPERTENSION: ICD-10-CM

## 2019-03-13 DIAGNOSIS — I34.0 MITRAL VALVE INSUFFICIENCY, UNSPECIFIED ETIOLOGY: ICD-10-CM

## 2019-03-13 DIAGNOSIS — I35.1 AORTIC VALVE INSUFFICIENCY, ETIOLOGY OF CARDIAC VALVE DISEASE UNSPECIFIED: ICD-10-CM

## 2019-03-13 DIAGNOSIS — I35.1 MODERATE AORTIC INSUFFICIENCY: ICD-10-CM

## 2019-03-13 DIAGNOSIS — I34.0 NON-RHEUMATIC MITRAL REGURGITATION: ICD-10-CM

## 2019-03-13 LAB
LV EF: 50 %
LVEF MODALITY: NORMAL

## 2019-03-13 PROCEDURE — 1090F PRES/ABSN URINE INCON ASSESS: CPT | Performed by: NURSE PRACTITIONER

## 2019-03-13 PROCEDURE — G8400 PT W/DXA NO RESULTS DOC: HCPCS | Performed by: NURSE PRACTITIONER

## 2019-03-13 PROCEDURE — 1036F TOBACCO NON-USER: CPT | Performed by: NURSE PRACTITIONER

## 2019-03-13 PROCEDURE — 1123F ACP DISCUSS/DSCN MKR DOCD: CPT | Performed by: NURSE PRACTITIONER

## 2019-03-13 PROCEDURE — 1101F PT FALLS ASSESS-DOCD LE1/YR: CPT | Performed by: NURSE PRACTITIONER

## 2019-03-13 PROCEDURE — 99214 OFFICE O/P EST MOD 30 MIN: CPT | Performed by: NURSE PRACTITIONER

## 2019-03-13 PROCEDURE — G8419 CALC BMI OUT NRM PARAM NOF/U: HCPCS | Performed by: NURSE PRACTITIONER

## 2019-03-13 PROCEDURE — 4040F PNEUMOC VAC/ADMIN/RCVD: CPT | Performed by: NURSE PRACTITIONER

## 2019-03-13 PROCEDURE — 93306 TTE W/DOPPLER COMPLETE: CPT

## 2019-03-13 PROCEDURE — G8484 FLU IMMUNIZE NO ADMIN: HCPCS | Performed by: NURSE PRACTITIONER

## 2019-03-13 PROCEDURE — G8427 DOCREV CUR MEDS BY ELIG CLIN: HCPCS | Performed by: NURSE PRACTITIONER

## 2019-03-13 RX ORDER — FERROUS SULFATE 325(65) MG
325 TABLET ORAL 2 TIMES DAILY
COMMUNITY
End: 2022-09-26

## 2019-03-13 RX ORDER — FUROSEMIDE 80 MG
80 TABLET ORAL 2 TIMES DAILY
COMMUNITY

## 2019-09-18 ENCOUNTER — OFFICE VISIT (OUTPATIENT)
Dept: CARDIOLOGY | Age: 79
End: 2019-09-18
Payer: MEDICARE

## 2019-09-18 VITALS
DIASTOLIC BLOOD PRESSURE: 64 MMHG | HEIGHT: 60 IN | HEART RATE: 60 BPM | SYSTOLIC BLOOD PRESSURE: 100 MMHG | WEIGHT: 144 LBS | BODY MASS INDEX: 28.27 KG/M2

## 2019-09-18 DIAGNOSIS — I35.1 MODERATE AORTIC REGURGITATION: ICD-10-CM

## 2019-09-18 DIAGNOSIS — I51.89 GRADE III DIASTOLIC DYSFUNCTION: ICD-10-CM

## 2019-09-18 DIAGNOSIS — I10 ESSENTIAL HYPERTENSION: Primary | ICD-10-CM

## 2019-09-18 DIAGNOSIS — I34.0 MODERATE MITRAL REGURGITATION: ICD-10-CM

## 2019-09-18 DIAGNOSIS — I10 ESSENTIAL HYPERTENSION: ICD-10-CM

## 2019-09-18 LAB
ANION GAP SERPL CALCULATED.3IONS-SCNC: 8 MMOL/L (ref 7–19)
BUN BLDV-MCNC: 18 MG/DL (ref 8–23)
CALCIUM SERPL-MCNC: 9.7 MG/DL (ref 8.8–10.2)
CHLORIDE BLD-SCNC: 106 MMOL/L (ref 98–111)
CO2: 31 MMOL/L (ref 22–29)
CREAT SERPL-MCNC: 0.6 MG/DL (ref 0.5–0.9)
GFR NON-AFRICAN AMERICAN: >60
GLUCOSE BLD-MCNC: 141 MG/DL (ref 74–109)
POTASSIUM SERPL-SCNC: 3.8 MMOL/L (ref 3.5–5)
SODIUM BLD-SCNC: 145 MMOL/L (ref 136–145)

## 2019-09-18 PROCEDURE — 99214 OFFICE O/P EST MOD 30 MIN: CPT | Performed by: NURSE PRACTITIONER

## 2019-09-18 PROCEDURE — 4040F PNEUMOC VAC/ADMIN/RCVD: CPT | Performed by: NURSE PRACTITIONER

## 2019-09-18 PROCEDURE — 93000 ELECTROCARDIOGRAM COMPLETE: CPT | Performed by: NURSE PRACTITIONER

## 2019-09-18 PROCEDURE — G8400 PT W/DXA NO RESULTS DOC: HCPCS | Performed by: NURSE PRACTITIONER

## 2019-09-18 PROCEDURE — G8419 CALC BMI OUT NRM PARAM NOF/U: HCPCS | Performed by: NURSE PRACTITIONER

## 2019-09-18 PROCEDURE — G8427 DOCREV CUR MEDS BY ELIG CLIN: HCPCS | Performed by: NURSE PRACTITIONER

## 2019-09-18 PROCEDURE — 1036F TOBACCO NON-USER: CPT | Performed by: NURSE PRACTITIONER

## 2019-09-18 PROCEDURE — 1090F PRES/ABSN URINE INCON ASSESS: CPT | Performed by: NURSE PRACTITIONER

## 2019-09-18 PROCEDURE — 1123F ACP DISCUSS/DSCN MKR DOCD: CPT | Performed by: NURSE PRACTITIONER

## 2019-09-18 NOTE — PROGRESS NOTES
Dear Jes Matthew, DO & [facility], DO,    Thank you for allowing me to participate in the care of Ms. Andre Alejandra. She presents today at the [address] [place] [address] in the [facility]. As you know, Ms. Lady Clifton is a 78 y.o. female with history of hypertension, hyperlipidemia, diabetes, mitral regurgitation, aortic regurgitation, and combined chronic systolic and diastolic heart failure who presents with the chief complaint of follow-up for chronic cardiac conditions. She is a patient of Dr. Glynn Zamarripa. Since last seen, she has been stable from a cardiac standpoint. She has shortness of air that is chronic and stable. She denies any chest pain. She is recently been treated for a cold and has been on antibiotics and steroids. She states that her PCP told her to take her [de-identified] of Lasix every day and has been taking for the past week. She normally monitors her weights at home and takes 80 mg of Lasix when her weight is up. She denies any fast or slow heart rhythms. she is sleeping on 1 pillow at night. she is not waking gasping for air. she denies any swelling. she has been compliant with her medications. her BP has been controlled. PCP follows labs and lipids. She otherwise denies chest pain, PND, orthopnea, syncope or near syncope. She has no other complaints. Review of Systems   Constitutional: Negative for fever, chills, diaphoresis, activity change, appetite change, fatigue and unexpected weight change. Eyes: Negative for photophobia, pain, redness and visual disturbance. Respiratory: Positive for SOA(, stable). Negative for apnea, cough, chest tightness, shortness of breath, wheezing and stridor. Cardiovascular: Negative for chest pain, palpitations and leg swelling. Gastrointestinal: Negative for abdominal distention. Genitourinary: Negative for dysuria, urgency and frequency.    Musculoskeletal: Negative for myalgias, arthralgias and gait problem. Skin: Negative for color change, pallor, rash and wound. Neurological: Negative for dizziness, tremors, speech difficulty, weakness and numbness. Hematological: Does not bruise/bleed easily. Psychiatric/Behavioral: Negative. Past Medical History:   Diagnosis Date    Aortic insufficiency 12/04/13    Mild to moderate aortic insufficency.  Carcinoid tumor of ovary     Chronic combined systolic and diastolic congestive heart failure (HCC)     [facility]     Diabetes ([facility]     Essential hypertension 8/29/2017    H/O: hysterectomy     Hepatitis B     Hx of breast cancer     History of bilateral breast cancer.  Hypertension     Incisional hernia with obstruction     with mesh placement    Moderate aortic insufficiency 02/17/2016    Moderate mitral regurgitation 02/17/2016    Moderate to severe mitral regurgitation.  Moderate tricuspid regurgitation 02/17/2016    Ovarian cyst     Pelvic mass     Radiation     Skin cancer        Past Surgical History:   Procedure Laterality Date    BREAST SURGERY      C[facility]    EF 60%    HERNIA REPAIR      HYSTERECTOMY, TOTAL ABDOMINAL  12/2003    [facility] (negative for cancer). Family History   Problem Relation Age of Onset    Heart Disease Mother     Diabetes Mother     Heart Disease Brother     Cancer Son         brain tumor       Social History     Socioeconomic History    Marital status:       Spouse name: Not on file    Number of children: Not on file    Years of education: Not on file    Highest education level: Not on file   Occupational History    Not on file   Social Needs    Financial resource strain: Not on file    Food insecurity:     Worry: Not on file     Inability: Not on file    Transportation needs:     Medical: Not on file     Non-medical: Not on file   Tobacco Use    Smoking status: Never Smoker    Smokeless tobacco: Never Used   Substance and Sexual Activity    Alcohol use: No    Drug use: No    Sexual activity: Not on file   Lifestyle    Physical activity:     Days per week: Not on file     Minutes per session: Not on file    Stress: Not on file   Relationships    Social connections:     Talks on phone: Not on file     Gets together: Not on file     Attends Christian service: Not on file     Active member of club or organization: Not on file     Attends meetings of clubs or organizations: Not on file     Relationship status: Not on file    Intimate partner violence:     Fear of current or ex partner: Not on file     Emotionally abused: Not on file     Physically abused: Not on file     Forced sexual activity: Not on file   Other Topics Concern    Not on file   Social History Narrative    Not on file       No Known Allergies      Current Outpatient Medications:     furosemide (LASIX) 80 MG tablet, Take 80 mg by mouth as needed (Only takes when she gains extra weight), Disp: , Rfl:     Alendronate Sodium (FOSAMAX PO), Take by mouth every 14 days, Disp: , Rfl:     Cyanocobalamin (VITAMIN B-12 PO), Take by mouth daily, Disp: , Rfl:     ferrous sulfate 325 (65 Fe) MG tablet, Take 325 mg by mouth 2 times daily, Disp: , Rfl:     carvedilol (COREG) 25 MG tablet, TAKE ONE TABLET BY MOUTH TWICE A DAY WITH MEALS, Disp: 60 tablet, Rfl: 5    cloNIDine (CATAPRES) 0.1 MG tablet, Take 1 tablet by mouth nightly, Disp: 60 tablet, Rfl: 3    furosemide (LASIX) 40 MG tablet, Take 40 mg by mouth daily, Disp: , Rfl:     VIREAD 300 MG tablet, Take 300 mg by mouth daily , Disp: , Rfl:     omeprazole (PRILOSEC) 40 MG delayed release capsule, 40 mg daily , Disp: , Rfl:     HYDROcodone-acetaminophen (NORCO)  MG per tablet, Take 1 tablet by mouth every 8 hours as needed , Disp: , Rfl:     amitriptyline (ELAVIL) 25 MG tablet, Take 25 mg by mouth nightly as needed. , Disp: , Rfl:     cholestyramine (QUESTRAN) 4 G packet, Take 4 g by mouth daily. , Disp: , Rfl:     PE:  Vitals:    09/18/19 0957   BP: 100/64   Pulse: 60       Estimated body mass index is 28.12 kg/m² as calculated from the following:    Height as of this encounter: 5' (1.524 m). Weight as of this encounter: 144 lb (65.3 kg). Constitutional: She is oriented to person, place, and time. She appears well-developed and well-nourished in no acute distress. Head: Normocephalic and atraumatic. Neck:  Neck supple without JVD present. Cardiovascular: Normal rate, Normal rhythm, normal heart sounds. 1/6 murmur ascultated. No gallop and no friction rub. No carotid bruits. No peripheral edema. Pulmonary/Chest:  Lungs clear to auscultation bilaterally without evidence of respiratory distress. She without wheezes. She without rales or ronchi. Musculoskeletal: Normal range of motion. Gait is normal.   Neurological: She is alert and oriented to person, place, and time. Skin: Skin is warm and dry without rash or pallor. Psychiatric: She has a normal mood and affect. Her behavior is normal. Thought content normal.     Lab Results   Component Value Date    CREATININE 0.5 09/20/2017    CREATININE 0.5 08/14/2017    HGB 12.3 09/20/2017    PROBNP 612 09/20/2017    PROBNP 1,045 08/14/2017       EKG 9/18/19  Sinus rhythm, nonspecific ST depression, HR 60 bpm    TTE-3/13/19   Mildly increased left ventricular size with preserved LV function and an   estimated ejection fraction of approximately 50%.   No evidence of left ventricular mass or thrombus noted.   Grade III diastolic dysfunction    Assessment, Recommendations, & Plan:  78 y.o. female with Chronic systolic and diastolic heart failure, HTN, Moderate aortic regurgitation, & Moderate Mitral regurgitation    HF-his echo showed an EF of 50%. She does have grade 3 diastolic dysfunction. She is on Lasix daily and takes an extra one for weight gain and swelling.  Will check BMP to see what potassium is with taking 80 mg Lasix daily    HTN- Controlled, No

## 2019-10-07 ENCOUNTER — TELEPHONE (OUTPATIENT)
Dept: NEUROSURGERY | Age: 79
End: 2019-10-07

## 2020-03-18 ENCOUNTER — OFFICE VISIT (OUTPATIENT)
Dept: CARDIOLOGY | Age: 80
End: 2020-03-18
Payer: MEDICARE

## 2020-03-18 VITALS
HEIGHT: 60 IN | WEIGHT: 147 LBS | SYSTOLIC BLOOD PRESSURE: 118 MMHG | BODY MASS INDEX: 28.86 KG/M2 | DIASTOLIC BLOOD PRESSURE: 78 MMHG | HEART RATE: 66 BPM

## 2020-03-18 PROCEDURE — G8427 DOCREV CUR MEDS BY ELIG CLIN: HCPCS | Performed by: NURSE PRACTITIONER

## 2020-03-18 PROCEDURE — 1123F ACP DISCUSS/DSCN MKR DOCD: CPT | Performed by: NURSE PRACTITIONER

## 2020-03-18 PROCEDURE — 1036F TOBACCO NON-USER: CPT | Performed by: NURSE PRACTITIONER

## 2020-03-18 PROCEDURE — 4040F PNEUMOC VAC/ADMIN/RCVD: CPT | Performed by: NURSE PRACTITIONER

## 2020-03-18 PROCEDURE — G8400 PT W/DXA NO RESULTS DOC: HCPCS | Performed by: NURSE PRACTITIONER

## 2020-03-18 PROCEDURE — G8417 CALC BMI ABV UP PARAM F/U: HCPCS | Performed by: NURSE PRACTITIONER

## 2020-03-18 PROCEDURE — 1090F PRES/ABSN URINE INCON ASSESS: CPT | Performed by: NURSE PRACTITIONER

## 2020-03-18 PROCEDURE — 99214 OFFICE O/P EST MOD 30 MIN: CPT | Performed by: NURSE PRACTITIONER

## 2020-03-18 PROCEDURE — G8484 FLU IMMUNIZE NO ADMIN: HCPCS | Performed by: NURSE PRACTITIONER

## 2020-03-18 NOTE — PROGRESS NOTES
activity     Days per week: Not on file     Minutes per session: Not on file    Stress: Not on file   Relationships    Social connections     Talks on phone: Not on file     Gets together: Not on file     Attends Tenriism service: Not on file     Active member of club or organization: Not on file     Attends meetings of clubs or organizations: Not on file     Relationship status: Not on file    Intimate partner violence     Fear of current or ex partner: Not on file     Emotionally abused: Not on file     Physically abused: Not on file     Forced sexual activity: Not on file   Other Topics Concern    Not on file   Social History Narrative    Not on file       No Known Allergies      Current Outpatient Medications:     furosemide (LASIX) 80 MG tablet, Take 80 mg by mouth as needed (Only takes when she gains extra weight), Disp: , Rfl:     Alendronate Sodium (FOSAMAX PO), Take by mouth every 14 days, Disp: , Rfl:     ferrous sulfate 325 (65 Fe) MG tablet, Take 325 mg by mouth 2 times daily, Disp: , Rfl:     carvedilol (COREG) 25 MG tablet, TAKE ONE TABLET BY MOUTH TWICE A DAY WITH MEALS, Disp: 60 tablet, Rfl: 5    cloNIDine (CATAPRES) 0.1 MG tablet, Take 1 tablet by mouth nightly, Disp: 60 tablet, Rfl: 3    furosemide (LASIX) 40 MG tablet, Take 40 mg by mouth daily, Disp: , Rfl:     VIREAD 300 MG tablet, Take 300 mg by mouth daily , Disp: , Rfl:     omeprazole (PRILOSEC) 40 MG delayed release capsule, 40 mg daily , Disp: , Rfl:     HYDROcodone-acetaminophen (NORCO)  MG per tablet, Take 1 tablet by mouth every 8 hours as needed , Disp: , Rfl:     amitriptyline (ELAVIL) 25 MG tablet, Take 25 mg by mouth nightly as needed. , Disp: , Rfl:     cholestyramine (QUESTRAN) 4 G packet, Take 4 g by mouth daily. , Disp: , Rfl:     Cyanocobalamin (VITAMIN B-12 PO), Take by mouth daily, Disp: , Rfl:     PE:  Vitals:    03/18/20 0955   BP: 118/78   Pulse: 66       Estimated body mass index is 28.71 kg/m² as

## 2020-03-18 NOTE — PATIENT INSTRUCTIONS
are not sleeping well. Shortness of breath wakes you up at night. You need extra pillows. Your doctor's name: ____________________________________________________________  Your doctor's contact information: _________________________________________________  Red zone. This is an emergency. Call  911. You have symptoms of sudden heart failure, such as:  · You have severe trouble breathing. · You cough up pink, foamy mucus. · You have a new irregular or fast heartbeat. You have symptoms of a heart attack. These may include:  · Chest pain or pressure, or a strange feeling in the chest.  · Sweating. · Shortness of breath. · Nausea or vomiting. · Pain, pressure, or a strange feeling in the back, neck, jaw, or upper belly or in one or both shoulders or arms. · Lightheadedness or sudden weakness. · A fast or irregular heartbeat. If you have symptoms of a heart attack: After you call  911, the  may tell you to chew 1 adult-strength or 2 to 4 low-dose aspirin. Wait for an ambulance. Do not try to drive yourself. Follow-up care is a key part of your treatment and safety. Be sure to make and go to all appointments, and call your doctor if you are having problems. It's also a good idea to know your test results and keep a list of the medicines you take. Where can you learn more? Go to https://[employer]. org and sign in to your lemonade.uk account. Enter T174 in the SafeTacMag box to learn more about \"Learning About Heart Failure Zones. \"     If you do not have an account, please click on the \"Sign Up Now\" link. Current as of: December 15, 2019Content Version: 12.4  © 3998-2683 Healthwise, Incorporated. Care instructions adapted under license by [facility] ([facility]). If you have questions about a medical condition or this instruction, always ask your healthcare professional. [place] [address] any warranty or liability for your use of this information.          Patient good.  · Avoid colds and flu. Get a pneumococcal vaccine shot. If you have had one before, ask your doctor if you need another dose. Get a flu vaccine every year. · Take care of your teeth and gums. Get regular dental checkups. Good dental health is important because bacteria can spread from infected teeth and gums to the heart valves. · If you have an artificial valve, you may need to take antibiotics before you have certain dental or surgical procedures. When should you call for help? Call 911 anytime you think you may need emergency care. For example, call if:    · You have severe trouble breathing.     · You cough up pink, foamy mucus and you have trouble breathing.     · You have symptoms of a heart attack. These may include:  ? Chest pain or pressure, or a strange feeling in the chest.  ? Sweating. ? Shortness of breath. ? Nausea or vomiting. ? Pain, pressure, or a strange feeling in the back, neck, jaw, or upper belly or in one or both shoulders or arms. ? Lightheadedness or sudden weakness. ? A fast or irregular heartbeat. After you call  911, the  may tell you to chew 1 adult-strength or 2 to 4 low-dose aspirin. Wait for an ambulance. Do not try to drive yourself.     · You have symptoms of a stroke. These may include:  ? Sudden numbness, tingling, weakness, or loss of movement in your face, arm, or leg, especially on only one side of your body. ? Sudden vision changes. ? Sudden trouble speaking. ? Sudden confusion or trouble understanding simple statements. ? Sudden problems with walking or balance. ? A sudden, severe headache that is different from past headaches.     · You passed out (lost consciousness).    Call your doctor now or seek immediate medical care if:    · You have new or increased shortness of breath.     · You are dizzy or lightheaded, or you feel like you may faint.     · You have sudden weight gain, such as more than 2 to 3 pounds in a day or 5 pounds in a week.

## 2020-05-26 ENCOUNTER — HOSPITAL ENCOUNTER (OUTPATIENT)
Dept: NON INVASIVE DIAGNOSTICS | Age: 80
Discharge: HOME | End: 2020-05-26
Payer: MEDICARE

## 2020-05-26 LAB
LV EF: 58 %
LVEF MODALITY: NORMAL

## 2020-05-26 PROCEDURE — 93306 TTE W/DOPPLER COMPLETE: CPT

## 2020-08-20 ENCOUNTER — TELEPHONE (OUTPATIENT)
Dept: CARDIOLOGY | Age: 80
End: 2020-08-20

## 2020-08-20 NOTE — TELEPHONE ENCOUNTER
Date: TBD    Cardiologist: Johnny    Procedure: Cataract Surgery    Surgeon: [facility]    Last Office Visit: 3/18/20  Reason for office visit and medical concerns addressed at this office visit: VHD, dm, htn    Testing Performed and Date of Service:  5/26/20 Echo  Mildly thickened mitral valve with normal leaflet mobility. No evidence of   mitral valve stenosis, mild to moderate mitral regurgitation. The aortic valve is trileaflet, mildly thickened & calcified with good   leaflet separation. No evidence of aortic stenosis; mild aortic regurgitation. Tricuspid valve is structurally normal.   Mild tricuspid regurgitation with estimated RVSP of 25 mm Hg. Normal left ventricular size with preserved LV function and an estimated   ejection fraction of approximately 55-60%. No evidence of left ventricular mass or thrombus noted. E/A flow reversal diastolic filling pattern consistent with Grade I   diastolic dysfunction. Mild left ventricular hypertrophy. RCRI = 0.4   METs 4    Current Medications: lasix, fosamax, iron, coreg, clonidine, viread, prilosec, amitriptyline, Carly Spry    Is the patient currently taking an anticoagulant?  If so, what is the diagnosis the patient has been given to warrant the need for the anticoagulant? none    Additional Notes: requesting caridac clearance prior to procedure

## 2020-09-21 ENCOUNTER — TELEPHONE (OUTPATIENT)
Dept: CARDIOLOGY | Age: 80
End: 2020-09-21

## 2020-09-21 NOTE — TELEPHONE ENCOUNTER
Kaylee Barber is overbooked on 09/24/2020. Called and unable to leave v/m for Pt.  Pt can reschedule to NP on and day and any time

## 2020-10-22 ENCOUNTER — OFFICE VISIT (OUTPATIENT)
Dept: CARDIOLOGY | Age: 80
End: 2020-10-22
Payer: MEDICARE

## 2020-10-22 VITALS
HEART RATE: 48 BPM | HEIGHT: 60 IN | BODY MASS INDEX: 28.86 KG/M2 | DIASTOLIC BLOOD PRESSURE: 70 MMHG | WEIGHT: 147 LBS | SYSTOLIC BLOOD PRESSURE: 128 MMHG

## 2020-10-22 PROCEDURE — G8417 CALC BMI ABV UP PARAM F/U: HCPCS | Performed by: INTERNAL MEDICINE

## 2020-10-22 PROCEDURE — G8484 FLU IMMUNIZE NO ADMIN: HCPCS | Performed by: INTERNAL MEDICINE

## 2020-10-22 PROCEDURE — 1090F PRES/ABSN URINE INCON ASSESS: CPT | Performed by: INTERNAL MEDICINE

## 2020-10-22 PROCEDURE — 99214 OFFICE O/P EST MOD 30 MIN: CPT | Performed by: INTERNAL MEDICINE

## 2020-10-22 PROCEDURE — 1036F TOBACCO NON-USER: CPT | Performed by: INTERNAL MEDICINE

## 2020-10-22 PROCEDURE — 4040F PNEUMOC VAC/ADMIN/RCVD: CPT | Performed by: INTERNAL MEDICINE

## 2020-10-22 PROCEDURE — G8427 DOCREV CUR MEDS BY ELIG CLIN: HCPCS | Performed by: INTERNAL MEDICINE

## 2020-10-22 PROCEDURE — G8400 PT W/DXA NO RESULTS DOC: HCPCS | Performed by: INTERNAL MEDICINE

## 2020-10-22 PROCEDURE — 1123F ACP DISCUSS/DSCN MKR DOCD: CPT | Performed by: INTERNAL MEDICINE

## 2020-10-22 ASSESSMENT — ENCOUNTER SYMPTOMS
GASTROINTESTINAL NEGATIVE: 1
EYES NEGATIVE: 1
RESPIRATORY NEGATIVE: 1
VOMITING: 0
SHORTNESS OF BREATH: 0
NAUSEA: 0
DIARRHEA: 0

## 2020-10-22 NOTE — PROGRESS NOTES
[facility] Progress Note                            Date:  10/22/2020  Patient: Liseth Sparrow  Age:  78 y.o., 1940      Reason for evaluation:         SUBJECTIVE:    Returns today follow-up assessment chief complaint is edema and lymphedema and chronic exertional dyspnea cannot walk but a short distance no definite anginal chest pain. She is not had stress testing or catheterization in recent years. Echocardiogram 5/26/2020 ejection fraction 55 to 51% grade 1 diastolic dysfunction mild to moderate MR mild TR RVSP 25 mmHg. Cardiac catheterization 12/4/2013 by Dr. Shellie Holliday mild to moderate aortic regurgitation mild mitral gravitation nonocclusive coronary artery disease. Review of Systems   Constitutional: Negative. Negative for chills, fever and unexpected weight change. HENT: Negative. Eyes: Negative. Respiratory: Negative. Negative for shortness of breath. Cardiovascular: Negative. Negative for chest pain. Gastrointestinal: Negative. Negative for diarrhea, nausea and vomiting. Endocrine: Negative. Genitourinary: Negative. Musculoskeletal: Negative. Skin: Negative. Neurological: Negative. All other systems reviewed and are negative. OBJECTIVE:     /70   Pulse (!) 48   Ht 5' (1.524 m)   Wt 147 lb (66.7 kg)   BMI 28.71 kg/m²     Labs:   CBC: No results for input(s): WBC, HGB, HCT, PLT in the last 72 hours. BMP:No results for input(s): NA, K, CO2, BUN, CREATININE, LABGLOM, GLUCOSE in the last 72 hours. BNP: No results for input(s): BNP in the last 72 hours. PT/INR: No results for input(s): PROTIME, INR in the last 72 hours. APTT:No results for input(s): APTT in the last 72 hours. CARDIAC ENZYMES:No results for input(s): CKTOTAL, CKMB, CKMBINDEX, TROPONINI in the last 72 hours. FASTING LIPID PANEL:No results found for: HDL, LDLDIRECT, LDLCALC, TRIG  LIVER PROFILE:No results for input(s): AST, ALT, LABALBU in the last 72 hours. Past Medical History:   Diagnosis Date    Aortic insufficiency 12/04/13    Mild to moderate aortic insufficency.  Carcinoid tumor of ovary     Chronic combined systolic and diastolic congestive heart failure (HCC)     Colon cancer ([address]     Diabetes [facility] [address]     Essential hypertension 8/29/2017    H/O: hysterectomy     Hepatitis B     Hx of breast cancer     History of bilateral breast cancer.  Hypertension     Incisional hernia with obstruction     with mesh placement    Moderate aortic insufficiency 02/17/2016    Moderate mitral regurgitation 02/17/2016    Moderate to severe mitral regurgitation.  Moderate tricuspid regurgitation 02/17/2016    Ovarian cyst     Pelvic mass     Radiation     Skin cancer      Past Surgical History:   Procedure Laterality Date    BREAST SURGERY      C[facility]    EF 60%    HERNIA REPAIR      HYSTERECTOMY, TOTAL ABDOMINAL  12/2003    [facility] (negative for cancer).      Family History   Problem Relation Age of Onset    Heart Disease Mother     Diabetes Mother     Heart Disease Brother     Cancer Son         brain tumor     No Known Allergies  Current Outpatient Medications   Medication Sig Dispense Refill    furosemide (LASIX) 80 MG tablet Take 80 mg by mouth as needed (Only takes when she gains extra weight)      Alendronate Sodium (FOSAMAX PO) Take by mouth every 14 days      Cyanocobalamin (VITAMIN B-12 PO) Take by mouth daily      ferrous sulfate 325 (65 Fe) MG tablet Take 325 mg by mouth 2 times daily      carvedilol (COREG) 25 MG tablet TAKE ONE TABLET BY MOUTH TWICE A DAY WITH MEALS 60 tablet 5    cloNIDine (CATAPRES) 0.1 MG tablet Take 1 tablet by mouth nightly 60 tablet 3    furosemide (LASIX) 40 MG tablet Take 40 mg by mouth daily      VIREAD 300 MG tablet Take 300 mg by mouth daily       omeprazole (PRILOSEC) 40 MG delayed release capsule 40 mg daily       HYDROcodone-acetaminophen (NORCO)  MG per tablet Take 1 tablet by mouth every 8 hours as needed       amitriptyline (ELAVIL) 25 MG tablet Take 25 mg by mouth nightly as needed.  cholestyramine (QUESTRAN) 4 G packet Take 4 g by mouth daily. No current facility-administered medications for this visit. Social History     Socioeconomic History    Marital status:      Spouse name: Not on file    Number of children: Not on file    Years of education: Not on file    Highest education level: Not on file   Occupational History    Not on file   Social Needs    Financial resource strain: Not on file    Food insecurity     Worry: Not on file     Inability: Not on file    Transportation needs     Medical: Not on file     Non-medical: Not on file   Tobacco Use    Smoking status: Never Smoker    Smokeless tobacco: Never Used   Substance and Sexual Activity    Alcohol use: No    Drug use: No    Sexual activity: Not on file   Lifestyle    Physical activity     Days per week: Not on file     Minutes per session: Not on file    Stress: Not on file   Relationships    Social connections     Talks on phone: Not on file     Gets together: Not on file     Attends Scientologist service: Not on file     Active member of club or organization: Not on file     Attends meetings of clubs or organizations: Not on file     Relationship status: Not on file    Intimate partner violence     Fear of current or ex partner: Not on file     Emotionally abused: Not on file     Physically abused: Not on file     Forced sexual activity: Not on file   Other Topics Concern    Not on file   Social History Narrative    Not on file       Physical Examination:  /70   Pulse (!) 48   Ht 5' (1.524 m)   Wt 147 lb (66.7 kg)   BMI 28.71 kg/m²   Physical Exam  Vitals signs reviewed. Constitutional:       Appearance: She is well-developed. Neck:      Vascular: No carotid bruit or JVD. Cardiovascular:      Rate and Rhythm: Normal rate and regular rhythm. Heart sounds: Normal heart sounds. No murmur. No friction rub. No gallop. Pulmonary:      Effort: Pulmonary effort is normal. No respiratory distress. Breath sounds: Normal breath sounds. No wheezing or rales. Abdominal:      General: There is no distension. Tenderness: There is no abdominal tenderness. Lymphadenopathy:      Cervical: No cervical adenopathy. Skin:     General: Skin is warm and dry. ASSESSMENT:     Diagnosis Orders   1. Chronic combined systolic and diastolic CHF (congestive heart failure) (HCC)  NM MYOCARDIAL SPECT REST EXERCISE OR RX   2. Left ventricular dysfunction     3. Moderate aortic insufficiency     4. Nonrheumatic mitral valve regurgitation     5. Essential hypertension     6. Dyspnea on exertion  NM MYOCARDIAL SPECT REST EXERCISE OR RX       PLAN:  Orders Placed This Encounter   Procedures    NM MYOCARDIAL SPECT REST EXERCISE OR RX     No orders of the defined types were placed in this encounter. 1. Continue present medications  2. Recommend Lexiscan pharmacologic stress test  3. Recommend follow-up assessment in 3 months    Return in about 3 months (around 1/22/2021) for return to Dr. Dante Kennedy only. Sinai Priest MD 10/22/2020 11:11 AM T    [facility]      Thisdictation was generated by voice recognition computer software. Although all attempts are made to edit the dictation for accuracy, there may be errors in the transcription that are not intended.

## 2020-10-22 NOTE — PATIENT INSTRUCTIONS
[place] at the [address] [facility] and [address] [facility] located on the first floor of [place] [address] through hospital main entrance and turn immediately to your left. Patient's contact number:  [phone] (home)      Surya Hetram Stress Test  Date: Oct 26 Monday 11:30    Lexiscan (regadenoson injection) is a prescription drug given through an IV line that increases blood flow through the arteries of the heart during a cardiac nuclear stress test.     There are two parts to a Lexiscan stress test: the rest portion and the exercise portion. For the rest portion, a radioactive tracer is injected into your arm through the IV. After 30 to 60 minutes, the process of imaging will begin. A nuclear camera will be placed on your chest area and images are taken for the next 15 to 20 minutes. For the exercise portion, a nurse will attach EKG electrodes to your chest to monitor your heart rate. The drug Leandrew Heys is administered to simulate stress on the heart. Your heart rhythm will then be monitored for the next few minutes. Your blood pressure will also be monitored throughout the exercise portion. Morris through the exercise portion, a second round of radioactive tracer is injected into your body. Your heart rate and EKG will be monitored for another few minutes after administering the drug. Test Preparation:     Bring a list of your current medications. Do not take any of your medications the morning of the test, but bring all morning medications with you as you will take them after the stress portion of the test is completed.  Do not eat Bananas 24 hours prior to test.     No caffeine 24 hours prior to the testing. This includes: coffee, pop/soda, chocolate, cold medications, etc.  Any product that might contain caffeine.  No nicotine or alcohol 12 hours prior to your test.    Nothing to eat or drink 6-8 hours prior to appointment time.   It is okay to drink small amounts of water during the four hours prior to the test.   Nitroglycerin patches must be taken off 1 hour before testing.  Wear comfortable clothing.  Please refrain from any strenuous exercise or activities the day before your test, or the day of your test.   The Nuclear Lexiscan Stress test takes about 2 ½ to 3 hours to complete. If for any reason you are unable to keep this appointment, please contact Outpatient Scheduling, [phone], as soon as possible to reschedule.

## 2020-10-23 ENCOUNTER — TELEPHONE (OUTPATIENT)
Dept: CARDIOLOGY | Age: 80
End: 2020-10-23

## 2020-10-26 ENCOUNTER — HOSPITAL ENCOUNTER (OUTPATIENT)
Dept: NUCLEAR MEDICINE | Age: 80
Discharge: HOME | End: 2020-10-28
Payer: MEDICARE

## 2020-10-26 PROCEDURE — A9500 TC99M SESTAMIBI: HCPCS | Performed by: INTERNAL MEDICINE

## 2020-10-26 PROCEDURE — 78452 HT MUSCLE IMAGE SPECT MULT: CPT

## 2020-10-26 PROCEDURE — 6360000002 HC RX W HCPCS: Performed by: INTERNAL MEDICINE

## 2020-10-26 PROCEDURE — 3430000000 HC RX DIAGNOSTIC RADIOPHARMACEUTICAL: Performed by: INTERNAL MEDICINE

## 2020-10-26 RX ADMIN — TETRAKIS(2-METHOXYISOBUTYLISOCYANIDE)COPPER(I) TETRAFLUOROBORATE 10 MILLICURIE: 1 INJECTION, POWDER, LYOPHILIZED, FOR SOLUTION INTRAVENOUS at 12:02

## 2020-10-26 RX ADMIN — REGADENOSON 0.4 MG: 0.08 INJECTION, SOLUTION INTRAVENOUS at 14:02

## 2020-10-26 RX ADMIN — TETRAKIS(2-METHOXYISOBUTYLISOCYANIDE)COPPER(I) TETRAFLUOROBORATE 30 MILLICURIE: 1 INJECTION, POWDER, LYOPHILIZED, FOR SOLUTION INTRAVENOUS at 12:02

## 2020-10-28 ENCOUNTER — TELEPHONE (OUTPATIENT)
Dept: CARDIOLOGY | Age: 80
End: 2020-10-28

## 2020-10-28 LAB
LV EF: 45 %
LVEF MODALITY: NORMAL

## 2020-11-02 ENCOUNTER — TELEPHONE (OUTPATIENT)
Dept: CARDIOLOGY | Age: 80
End: 2020-11-02

## 2020-11-02 NOTE — TELEPHONE ENCOUNTER
----- Message from Lu Bahena MD sent at 10/28/2020  4:43 PM CDT -----  Advise results consider cardiac catheterization especially if symptomatic

## 2020-11-02 NOTE — TELEPHONE ENCOUNTER
Spoke with patient says she is unsure if she wants to have this done or not. She will call me back if she wants to get this scheduled.

## 2021-01-25 ENCOUNTER — OFFICE VISIT (OUTPATIENT)
Dept: CARDIOLOGY CLINIC | Age: 81
End: 2021-01-25
Payer: MEDICARE

## 2021-01-25 VITALS
HEART RATE: 82 BPM | BODY MASS INDEX: 29.45 KG/M2 | SYSTOLIC BLOOD PRESSURE: 138 MMHG | WEIGHT: 150 LBS | HEIGHT: 60 IN | DIASTOLIC BLOOD PRESSURE: 70 MMHG

## 2021-01-25 DIAGNOSIS — I50.42 CHRONIC COMBINED SYSTOLIC AND DIASTOLIC CHF (CONGESTIVE HEART FAILURE) (HCC): Primary | ICD-10-CM

## 2021-01-25 DIAGNOSIS — I10 ESSENTIAL HYPERTENSION: ICD-10-CM

## 2021-01-25 DIAGNOSIS — I35.1 MODERATE AORTIC INSUFFICIENCY: ICD-10-CM

## 2021-01-25 DIAGNOSIS — I34.0 MODERATE MITRAL REGURGITATION: ICD-10-CM

## 2021-01-25 DIAGNOSIS — I51.9 LEFT VENTRICULAR DYSFUNCTION: ICD-10-CM

## 2021-01-25 DIAGNOSIS — I34.0 NONRHEUMATIC MITRAL VALVE REGURGITATION: ICD-10-CM

## 2021-01-25 DIAGNOSIS — I07.1 MODERATE TRICUSPID REGURGITATION: ICD-10-CM

## 2021-01-25 DIAGNOSIS — R06.02 SOB (SHORTNESS OF BREATH): ICD-10-CM

## 2021-01-25 PROCEDURE — 1090F PRES/ABSN URINE INCON ASSESS: CPT | Performed by: INTERNAL MEDICINE

## 2021-01-25 PROCEDURE — 4040F PNEUMOC VAC/ADMIN/RCVD: CPT | Performed by: INTERNAL MEDICINE

## 2021-01-25 PROCEDURE — 1036F TOBACCO NON-USER: CPT | Performed by: INTERNAL MEDICINE

## 2021-01-25 PROCEDURE — G8417 CALC BMI ABV UP PARAM F/U: HCPCS | Performed by: INTERNAL MEDICINE

## 2021-01-25 PROCEDURE — G8484 FLU IMMUNIZE NO ADMIN: HCPCS | Performed by: INTERNAL MEDICINE

## 2021-01-25 PROCEDURE — 99213 OFFICE O/P EST LOW 20 MIN: CPT | Performed by: INTERNAL MEDICINE

## 2021-01-25 PROCEDURE — 1123F ACP DISCUSS/DSCN MKR DOCD: CPT | Performed by: INTERNAL MEDICINE

## 2021-01-25 PROCEDURE — G8400 PT W/DXA NO RESULTS DOC: HCPCS | Performed by: INTERNAL MEDICINE

## 2021-01-25 PROCEDURE — G8427 DOCREV CUR MEDS BY ELIG CLIN: HCPCS | Performed by: INTERNAL MEDICINE

## 2021-01-25 ASSESSMENT — ENCOUNTER SYMPTOMS
RESPIRATORY NEGATIVE: 1
VOMITING: 0
NAUSEA: 0
SHORTNESS OF BREATH: 0
DIARRHEA: 0
GASTROINTESTINAL NEGATIVE: 1
EYES NEGATIVE: 1

## 2021-01-25 NOTE — PROGRESS NOTES
[facility] Progress Note                            Date:  1/25/2021  Patient: Hernando David  Age:  78 y.o., 1940      Reason for evaluation:         SUBJECTIVE:    Returns today for follow-up assessment overall doing well edema comes and goes but generally stable. Exertional dyspnea improved denies chest pain no new complaints or issues reported. Blood pressure 138 or 70 heart 82. Review of Systems   Constitutional: Negative. Negative for chills, fever and unexpected weight change. HENT: Negative. Eyes: Negative. Respiratory: Negative. Negative for shortness of breath. Cardiovascular: Negative. Negative for chest pain. Gastrointestinal: Negative. Negative for diarrhea, nausea and vomiting. Endocrine: Negative. Genitourinary: Negative. Musculoskeletal: Negative. Skin: Negative. Neurological: Negative. All other systems reviewed and are negative. OBJECTIVE:     /70   Pulse 82   Ht 5' (1.524 m)   Wt 150 lb (68 kg)   BMI 29.29 kg/m²     Labs:   CBC: No results for input(s): WBC, HGB, HCT, PLT in the last 72 hours. BMP:No results for input(s): NA, K, CO2, BUN, CREATININE, LABGLOM, GLUCOSE in the last 72 hours. BNP: No results for input(s): BNP in the last 72 hours. PT/INR: No results for input(s): PROTIME, INR in the last 72 hours. APTT:No results for input(s): APTT in the last 72 hours. CARDIAC ENZYMES:No results for input(s): CKTOTAL, CKMB, CKMBINDEX, TROPONINI in the last 72 hours. FASTING LIPID PANEL:No results found for: HDL, LDLDIRECT, LDLCALC, TRIG  LIVER PROFILE:No results for input(s): AST, ALT, LABALBU in the last 72 hours. Past Medical History:   Diagnosis Date    Aortic insufficiency 12/04/13    Mild to moderate aortic insufficency.     Carcinoid tumor of ovary     Chronic combined systolic and diastolic congestive heart failure (HCC)     Colon cancer (HCC)     Diabetes [facility] [address]     Essential hypertension 8/29/2017    H/O: hysterectomy     Hepatitis B     Hx of breast cancer     History of bilateral breast cancer.  Hypertension     Incisional hernia with obstruction     with mesh placement    Moderate aortic insufficiency 02/17/2016    Moderate mitral regurgitation 02/17/2016    Moderate to severe mitral regurgitation.  Moderate tricuspid regurgitation 02/17/2016    Ovarian cyst     Pelvic mass     Radiation     Skin cancer      Past Surgical History:   Procedure Laterality Date    BREAST SURGERY      [facility]    EF 60%    HERNIA REPAIR      HYSTERECTOMY, TOTAL ABDOMINAL  12/2003    [facility] (negative for cancer). Family History   Problem Relation Age of Onset    Heart Disease Mother     Diabetes Mother     Heart Disease Brother     Cancer Son         brain tumor     No Known Allergies  Current Outpatient Medications   Medication Sig Dispense Refill    furosemide (LASIX) 80 MG tablet Take 80 mg by mouth as needed (Only takes when she gains extra weight)      Alendronate Sodium (FOSAMAX PO) Take by mouth every 14 days      Cyanocobalamin (VITAMIN B-12 PO) Take by mouth daily      ferrous sulfate 325 (65 Fe) MG tablet Take 325 mg by mouth 2 times daily      carvedilol (COREG) 25 MG tablet TAKE ONE TABLET BY MOUTH TWICE A DAY WITH MEALS 60 tablet 5    cloNIDine (CATAPRES) 0.1 MG tablet Take 1 tablet by mouth nightly 60 tablet 3    furosemide (LASIX) 40 MG tablet Take 40 mg by mouth daily      VIREAD 300 MG tablet Take 300 mg by mouth daily       omeprazole (PRILOSEC) 40 MG delayed release capsule 40 mg daily       HYDROcodone-acetaminophen (NORCO)  MG per tablet Take 1 tablet by mouth every 8 hours as needed       amitriptyline (ELAVIL) 25 MG tablet Take 25 mg by mouth nightly as needed.  cholestyramine (QUESTRAN) 4 G packet Take 4 g by mouth daily. No current facility-administered medications for this visit.       Social History Socioeconomic History    Marital status:      Spouse name: Not on file    Number of children: Not on file    Years of education: Not on file    Highest education level: Not on file   Occupational History    Not on file   Social Needs    Financial resource strain: Not on file    Food insecurity     Worry: Not on file     Inability: Not on file    Transportation needs     Medical: Not on file     Non-medical: Not on file   Tobacco Use    Smoking status: Never Smoker    Smokeless tobacco: Never Used   Substance and Sexual Activity    Alcohol use: No    Drug use: No    Sexual activity: Not on file   Lifestyle    Physical activity     Days per week: Not on file     Minutes per session: Not on file    Stress: Not on file   Relationships    Social connections     Talks on phone: Not on file     Gets together: Not on file     Attends Mormon service: Not on file     Active member of club or organization: Not on file     Attends meetings of clubs or organizations: Not on file     Relationship status: Not on file    Intimate partner violence     Fear of current or ex partner: Not on file     Emotionally abused: Not on file     Physically abused: Not on file     Forced sexual activity: Not on file   Other Topics Concern    Not on file   Social History Narrative    Not on file       Physical Examination:  /70   Pulse 82   Ht 5' (1.524 m)   Wt 150 lb (68 kg)   BMI 29.29 kg/m²   Physical Exam  Constitutional:       Appearance: She is well-developed. Neck:      Vascular: No carotid bruit or JVD. Cardiovascular:      Rate and Rhythm: Normal rate and regular rhythm. Heart sounds: Normal heart sounds. No murmur. No friction rub. No gallop. Pulmonary:      Effort: Pulmonary effort is normal. No respiratory distress. Breath sounds: Normal breath sounds. No wheezing or rales. Abdominal:      General: There is no distension. Tenderness: There is no abdominal tenderness. Lymphadenopathy:      Cervical: No cervical adenopathy. Skin:     General: Skin is warm and dry. ASSESSMENT:     Diagnosis Orders   1. Chronic combined systolic and diastolic CHF (congestive heart failure) ([address]     2. Moderate aortic insufficiency     3. Moderate tricuspid regurgitation     4. Nonrheumatic mitral valve regurgitation     5. Left ventricular dysfunction     6. Moderate mitral regurgitation     7. Essential hypertension     8. SOB (shortness of breath)         PLAN:  No orders of the defined types were placed in this encounter. No orders of the defined types were placed in this encounter. 1. Continue present medications  2. Recommend follow-up assessment in 6 months    Return in about 6 months (around 7/25/2021) for return to Dr. Sam Candelario only. Daljit Malcolm MD 1/25/2021 2:12 PM [facility]      Thisdictation was generated by voice recognition computer software. Although all attempts are made to edit the dictation for accuracy, there may be errors in the transcription that are not intended.

## 2021-04-25 ENCOUNTER — HOSPITAL ENCOUNTER (EMERGENCY)
Age: 81
Discharge: HOME | End: 2021-04-25
Attending: EMERGENCY MEDICINE
Payer: MEDICARE

## 2021-04-25 ENCOUNTER — APPOINTMENT (OUTPATIENT)
Dept: GENERAL RADIOLOGY | Age: 81
End: 2021-04-25
Payer: MEDICARE

## 2021-04-25 VITALS
DIASTOLIC BLOOD PRESSURE: 80 MMHG | BODY MASS INDEX: 28.27 KG/M2 | WEIGHT: 144 LBS | OXYGEN SATURATION: 91 % | HEART RATE: 80 BPM | RESPIRATION RATE: 20 BRPM | HEIGHT: 60 IN | TEMPERATURE: 97.9 F | SYSTOLIC BLOOD PRESSURE: 144 MMHG

## 2021-04-25 DIAGNOSIS — I50.43 ACUTE ON CHRONIC COMBINED SYSTOLIC AND DIASTOLIC CHF (CONGESTIVE HEART FAILURE) (HCC): Primary | ICD-10-CM

## 2021-04-25 DIAGNOSIS — E87.6 HYPOKALEMIA: ICD-10-CM

## 2021-04-25 DIAGNOSIS — I34.0 NONRHEUMATIC MITRAL VALVE REGURGITATION: ICD-10-CM

## 2021-04-25 LAB
ALBUMIN SERPL-MCNC: 4.4 G/DL (ref 3.5–5.2)
ALP BLD-CCNC: 101 U/L (ref 35–104)
ALT SERPL-CCNC: 23 U/L (ref 5–33)
ANION GAP SERPL CALCULATED.3IONS-SCNC: 10 MMOL/L (ref 7–19)
APTT: 31.2 SEC (ref 26–36.2)
AST SERPL-CCNC: 22 U/L (ref 5–32)
BASOPHILS ABSOLUTE: 0 K/UL (ref 0–0.2)
BASOPHILS RELATIVE PERCENT: 0.2 % (ref 0–1)
BILIRUB SERPL-MCNC: 1 MG/DL (ref 0.2–1.2)
BUN BLDV-MCNC: 14 MG/DL (ref 8–23)
CALCIUM SERPL-MCNC: 9.7 MG/DL (ref 8.8–10.2)
CHLORIDE BLD-SCNC: 98 MMOL/L (ref 98–111)
CO2: 34 MMOL/L (ref 22–29)
CREAT SERPL-MCNC: 0.6 MG/DL (ref 0.5–0.9)
D DIMER: 0.37 UG/ML FEU (ref 0–0.48)
EOSINOPHILS ABSOLUTE: 0.1 K/UL (ref 0–0.6)
EOSINOPHILS RELATIVE PERCENT: 0.6 % (ref 0–5)
GFR AFRICAN AMERICAN: >59
GFR NON-AFRICAN AMERICAN: >60
GLUCOSE BLD-MCNC: 101 MG/DL (ref 74–109)
HCT VFR BLD CALC: 41.8 % (ref 37–47)
HEMOGLOBIN: 14 G/DL (ref 12–16)
IMMATURE GRANULOCYTES #: 0 K/UL
INR BLD: 1.19 (ref 0.88–1.18)
LYMPHOCYTES ABSOLUTE: 1.5 K/UL (ref 1.1–4.5)
LYMPHOCYTES RELATIVE PERCENT: 16.9 % (ref 20–40)
MAGNESIUM: 2 MG/DL (ref 1.6–2.4)
MCH RBC QN AUTO: 34 PG (ref 27–31)
MCHC RBC AUTO-ENTMCNC: 33.5 G/DL (ref 33–37)
MCV RBC AUTO: 101.5 FL (ref 81–99)
MONOCYTES ABSOLUTE: 0.8 K/UL (ref 0–0.9)
MONOCYTES RELATIVE PERCENT: 9.5 % (ref 0–10)
NEUTROPHILS ABSOLUTE: 6.2 K/UL (ref 1.5–7.5)
NEUTROPHILS RELATIVE PERCENT: 72.4 % (ref 50–65)
PDW BLD-RTO: 13.9 % (ref 11.5–14.5)
PLATELET # BLD: 133 K/UL (ref 130–400)
PMV BLD AUTO: 10.7 FL (ref 9.4–12.3)
POTASSIUM REFLEX MAGNESIUM: 3.3 MMOL/L (ref 3.5–5)
PRO-BNP: 8001 PG/ML (ref 0–1800)
PROTHROMBIN TIME: 15.1 SEC (ref 12–14.6)
RBC # BLD: 4.12 M/UL (ref 4.2–5.4)
SARS-COV-2, NAAT: NOT DETECTED
SODIUM BLD-SCNC: 142 MMOL/L (ref 136–145)
TOTAL PROTEIN: 7.4 G/DL (ref 6.6–8.7)
WBC # BLD: 8.6 K/UL (ref 4.8–10.8)

## 2021-04-25 PROCEDURE — 71045 X-RAY EXAM CHEST 1 VIEW: CPT

## 2021-04-25 PROCEDURE — 83880 ASSAY OF NATRIURETIC PEPTIDE: CPT

## 2021-04-25 PROCEDURE — 87635 SARS-COV-2 COVID-19 AMP PRB: CPT

## 2021-04-25 PROCEDURE — 96374 THER/PROPH/DIAG INJ IV PUSH: CPT

## 2021-04-25 PROCEDURE — 6370000000 HC RX 637 (ALT 250 FOR IP): Performed by: EMERGENCY MEDICINE

## 2021-04-25 PROCEDURE — 85025 COMPLETE CBC W/AUTO DIFF WBC: CPT

## 2021-04-25 PROCEDURE — 85730 THROMBOPLASTIN TIME PARTIAL: CPT

## 2021-04-25 PROCEDURE — 85379 FIBRIN DEGRADATION QUANT: CPT

## 2021-04-25 PROCEDURE — 6360000002 HC RX W HCPCS: Performed by: EMERGENCY MEDICINE

## 2021-04-25 PROCEDURE — 36415 COLL VENOUS BLD VENIPUNCTURE: CPT

## 2021-04-25 PROCEDURE — 99283 EMERGENCY DEPT VISIT LOW MDM: CPT

## 2021-04-25 PROCEDURE — 85610 PROTHROMBIN TIME: CPT

## 2021-04-25 PROCEDURE — 80053 COMPREHEN METABOLIC PANEL: CPT

## 2021-04-25 PROCEDURE — 93005 ELECTROCARDIOGRAM TRACING: CPT | Performed by: EMERGENCY MEDICINE

## 2021-04-25 PROCEDURE — 83735 ASSAY OF MAGNESIUM: CPT

## 2021-04-25 RX ORDER — POTASSIUM CHLORIDE 20 MEQ/1
40 TABLET, EXTENDED RELEASE ORAL ONCE
Status: COMPLETED | OUTPATIENT
Start: 2021-04-25 | End: 2021-04-25

## 2021-04-25 RX ORDER — FUROSEMIDE 10 MG/ML
60 INJECTION INTRAMUSCULAR; INTRAVENOUS ONCE
Status: COMPLETED | OUTPATIENT
Start: 2021-04-25 | End: 2021-04-25

## 2021-04-25 RX ORDER — TIZANIDINE 2 MG/1
2 TABLET ORAL EVERY 8 HOURS PRN
Qty: 10 TABLET | Refills: 0 | Status: SHIPPED | OUTPATIENT
Start: 2021-04-25 | End: 2022-09-26

## 2021-04-25 RX ORDER — POTASSIUM CHLORIDE 20 MEQ/1
40 TABLET, EXTENDED RELEASE ORAL DAILY
Qty: 14 TABLET | Refills: 0 | Status: SHIPPED | OUTPATIENT
Start: 2021-04-25 | End: 2021-05-03

## 2021-04-25 RX ORDER — TIZANIDINE 4 MG/1
2 TABLET ORAL ONCE
Status: COMPLETED | OUTPATIENT
Start: 2021-04-25 | End: 2021-04-25

## 2021-04-25 RX ADMIN — TIZANIDINE 2 MG: 4 TABLET ORAL at 15:20

## 2021-04-25 RX ADMIN — FUROSEMIDE 60 MG: 10 INJECTION, SOLUTION INTRAMUSCULAR; INTRAVENOUS at 15:20

## 2021-04-25 RX ADMIN — POTASSIUM CHLORIDE 40 MEQ: 20 TABLET, EXTENDED RELEASE ORAL at 15:20

## 2021-04-25 ASSESSMENT — ENCOUNTER SYMPTOMS
SHORTNESS OF BREATH: 1
RHINORRHEA: 0
EYE REDNESS: 0
VOICE CHANGE: 0
COUGH: 1
EYE PAIN: 0
DIARRHEA: 0
VOMITING: 0
ABDOMINAL PAIN: 0

## 2021-04-25 NOTE — ED PROVIDER NOTES
G 1 T 0 P  0 A  0 L 0    EDC  2018     Gestational Age   39+6   wga        Dated by: LMP    LMP 2017     Prenatal Care: Dr. Huber     Prenatal Labs: Blood type: O+, RPR negative, Rubella immune, HepBsAg negative, HIV negative, GBS negative    Chief Complaint:  IOL 2/2 AMA     HPI: Pt is a 36yo  @ 39+6 wga who presents for IOL 2/2 AMA. Denies vaginal bleeding, LOF. Infrequent contractions. Endorses fetal movement. No other complaints.    Current pregnancy: no complications    OB History:     Medications: PNV, Paxil 10mg QD, Iron, Flexeril 5mg QHS     Allergies: ASA, Epinephrine, Erythromycin, Mushrooms     Medical History: Anemia, Anxiety/panic attacks, Carpal tunnel      Surgical History: Dental surgery, tonsillectomy at age 1     Anesthesia Complications: Denies    GYN History:   Abnormal pap: denies. Pap 10/18/2017 NILM   STI Hx: denies    Social History: Denies E/T/I    Family History: denies gyne malignancy or bleeding diathesis       ------------------------------------    Physical Exam:      Vitals between:   22-MAY-2018 05:31:23   TO   23-MAY-2018 05:31:23                   LAST RESULT MINIMUM MAXIMUM  Temperature 36.1 36.1 36.1  Heart Rate 98 98 98  Respiratory Rate 16 16 16  NISBP           140 140 140  NIDBP           98 98 98  NIMBP           112 112 112    Heart RRR, No S3, S4, rubs, or gallops  Lungs CTAB, no wheezes, rales, or ronchi  Abdomen Soft, NT, gravid   Extremities: trace pedal edema, pulses intact and equal in all extremities  Neuro/Reflexes: WNL    SVE: 2-3  Vaginal Bleeding: denies  Membranes Status: intact    EFM:  FHR Baseline: 120 baseline   Variability mod   Accelerations present   Decelerations absent   TOCO acontractile       Bedside Ultrasound:  Presentation: [facility]  Placenta:  anterior    EFW: 8#  ------------------------    Assessment / Plan:   36yo  @ 39+6 wga who presents for IOL 2/2 AMA.  1) CBC  2) T+S  3) IVF  4) NPO  5) Epidural PRN  6) GBS: neg,  [facility] EMERGENCY DEPT  EMERGENCY DEPARTMENT ENCOUNTER      Pt Name: Janusz Vega  MRN: [id number]  [place] 1940  Date of evaluation: 4/25/2021  Provider: Anand Velasco MD    CHIEF COMPLAINT       Chief Complaint   Patient presents with    Shortness of Breath     hx of CHF, 'leaky valves\"         HISTORY OF PRESENT ILLNESS   (Location/Symptom, Timing/Onset,Context/Setting, Quality, Duration, Modifying Factors, Severity)  Note limiting factors. Janusz Vega is a 78 y.o. female who presents to the emergency department with complaint of shortness of breath that is gradually been worsening over the last week. Patient has a history of CHF as well as aortic and mitral regurgitation. States she has chronic bilateral lower extremity edema, left greater than worse. States it has not significantly worsened recently. Does note mild associated cough but no fevers or sputum production. States she feels like there is a tightness in her chest when she gets short of breath has any chest pain. States she took an extra 80 mg Lasix a few hours ago this morning but has not noticed any change since then. HPI    NursingNotes were reviewed. REVIEW OF SYSTEMS    (2-9 systems for level 4, 10 or more for level 5)     Review of Systems   Constitutional: Positive for fatigue. Negative for fever. HENT: Negative for congestion, rhinorrhea and voice change. Eyes: Negative for pain and redness. Respiratory: Positive for cough and shortness of breath. Cardiovascular: Positive for leg swelling. Negative for chest pain. Gastrointestinal: Negative for abdominal pain, diarrhea and vomiting. Endocrine: Negative. Genitourinary: Negative. Musculoskeletal: Negative for arthralgias and gait problem. Skin: Negative for rash and wound. Neurological: Negative for weakness and headaches. Hematological: Negative. Psychiatric/Behavioral: Negative. All other systems reviewed and are negative.       A complete no ppx needed   7) Labor: low dose oxytocin   8) Mild range BP: PreE labs sent     Discussed with Dr. Mayo Sultana MD   PGY-1, Obstetrics and Gynecology   Pager # [phone]             Electronically Signed On 05/23/2018 05:32  __________________________________________________   KIN SCHILLING      Electronically Signed On 06/07/2018 07:05  __________________________________________________   LISA MOON     review of systems was performed and is negative except as noted above in the HPI. PAST MEDICAL HISTORY     Past Medical History:   Diagnosis Date    Aortic insufficiency 12/04/13    Mild to moderate aortic insufficency.  Carcinoid tumor of ovary     Chronic combined systolic and diastolic congestive heart failure (HCC)     [facility] [address]     Diabetes ([facility] [address] [facility]     Essential hypertension 8/29/2017    H/O: hysterectomy     Hepatitis B     Hx of breast cancer     History of bilateral breast cancer.  Hypertension     Incisional hernia with obstruction     with mesh placement    Moderate aortic insufficiency 02/17/2016    Moderate mitral regurgitation 02/17/2016    Moderate to severe mitral regurgitation.  Moderate tricuspid regurgitation 02/17/2016    Ovarian cyst     Pelvic mass     Radiation     Skin cancer          SURGICAL HISTORY       Past Surgical History:   Procedure Laterality Date    BREAST SURGERY      CARDIAC [facility]    EF 60%    HERNIA REPAIR      HYSTERECTOMY, TOTAL ABDOMINAL  12/2003    [facility] (negative for cancer).          CURRENT MEDICATIONS       Discharge Medication List as of 4/25/2021  3:33 PM      CONTINUE these medications which have NOT CHANGED    Details   !! furosemide (LASIX) 80 MG tablet Take 80 mg by mouth as needed (Only takes when she gains extra weight)Historical Med      Alendronate Sodium (FOSAMAX PO) Take by mouth every 14 daysHistorical Med      Cyanocobalamin (VITAMIN B-12 PO) Take by mouth dailyHistorical Med      ferrous sulfate 325 (65 Fe) MG tablet Take 325 mg by mouth 2 times dailyHistorical Med      carvedilol (COREG) 25 MG tablet TAKE ONE TABLET BY MOUTH TWICE A DAY WITH MEALS, Disp-60 tablet, R-5Normal      cloNIDine (CATAPRES) 0.1 MG tablet Take 1 tablet by mouth nightly, Disp-60 tablet, R-3Normal      !! furosemide (LASIX) 40 MG tablet Take 40 mg by mouth dailyHistorical Med      VIREAD 300 MG tablet Take 300 mg by mouth daily , DAWHistorical Med      omeprazole (PRILOSEC) 40 MG delayed release capsule 40 mg daily Historical Med      HYDROcodone-acetaminophen (NORCO)  MG per tablet Take 1 tablet by mouth every 8 hours as needed       amitriptyline (ELAVIL) 25 MG tablet Take 25 mg by mouth nightly as needed. cholestyramine (QUESTRAN) 4 G packet Take 4 g by mouth daily. !! - Potential duplicate medications found. Please discuss with provider. ALLERGIES     Patient has no known allergies. FAMILY HISTORY       Family History   Problem Relation Age of Onset    Heart Disease Mother     Diabetes Mother     Heart Disease Brother     Cancer Son         brain tumor          SOCIAL HISTORY       Social History     Socioeconomic History    Marital status:       Spouse name: Not on file    Number of children: Not on file    Years of education: Not on file    Highest education level: Not on file   Occupational History    Not on file   Social Needs    Financial resource strain: Not on file    Food insecurity     Worry: Not on file     Inability: Not on file    Transportation needs     Medical: Not on file     Non-medical: Not on file   Tobacco Use    Smoking status: Never Smoker    Smokeless tobacco: Never Used   Substance and Sexual Activity    Alcohol use: No    Drug use: No    Sexual activity: Not on file   Lifestyle    Physical activity     Days per week: Not on file     Minutes per session: Not on file    Stress: Not on file   Relationships    Social connections     Talks on phone: Not on file     Gets together: Not on file     Attends Sikh service: Not on file     Active member of club or organization: Not on file     Attends meetings of clubs or organizations: Not on file     Relationship status: Not on file    Intimate partner violence     Fear of current or ex partner: Not on file     Emotionally abused: Not on file     Physically abused: Not on file     Forced sexual activity: Not on file   Other Topics Concern    Not on file   Social History Narrative    Not on file       SCREENINGS             PHYSICAL EXAM    (up to 7 for level 4, 8 or more for level 5)     ED Triage Vitals [21 1206]   BP Temp Temp Source Pulse Resp SpO2 Height Weight   (!) 142/87 97.9 °F (36.6 °C) Temporal 86 20 94 % 5' (1.524 m) 144 lb (65.3 kg)       Physical Exam  Vitals signs and nursing note reviewed. Constitutional:       General: She is not in acute distress. Appearance: She is well-developed. She is not toxic-appearing or diaphoretic. HENT:      Head: Normocephalic and atraumatic. Eyes:      General: No scleral icterus. Right eye: No discharge. Left eye: No discharge. Pupils: Pupils are equal, round, and reactive to light. Neck:      Musculoskeletal: Normal range of motion. Cardiovascular:      Rate and Rhythm: Normal rate and regular rhythm. Heart sounds: Heart sounds are distant. Pulmonary:      Effort: Pulmonary effort is normal. No tachypnea or respiratory distress. Breath sounds: No stridor. Comments: Breath sounds decreased in the bases. Occasional coarse scattered breath sounds but no focal rhonchi  Abdominal:      General: There is no distension. Musculoskeletal: Normal range of motion. General: No deformity. Right lower le+ Edema present. Left lower le+ Edema present. Skin:     General: Skin is warm and dry. Neurological:      Mental Status: She is alert and oriented to person, place, and time. GCS: GCS eye subscore is 4. GCS verbal subscore is 5. GCS motor subscore is 6. Cranial Nerves: No cranial nerve deficit. Motor: No abnormal muscle tone. Psychiatric:         Behavior: Behavior normal.         Thought Content:  Thought content normal.         Judgment: Judgment normal.         DIAGNOSTIC RESULTS     EKG: All EKG's are interpreted by the Emergency Department Physician who either signs or Co-signs this chart in the absence of a cardiologist.        RADIOLOGY:   Non-plain film images such as CT, Ultrasound and MRI are read by the radiologist. Plainradiographic images are visualized and preliminarily interpreted by the emergency physician with the below findings:      Interpretation per the Radiologist below, if available at the time of this note:    XR CHEST PORTABLE   Final Result   1. Stable cardiomegaly with linear scarring and atelectasis. No focal   consolidation or convincing coronary edema. .   Signed by Dr Christiana Crowley on 4/25/2021 12:40 PM            ED BEDSIDE ULTRASOUND:   Performed by ED Physician - none    LABS:  Labs Reviewed   CBC WITH AUTO DIFFERENTIAL - Abnormal; Notable for the following components:       Result Value    RBC 4.12 (*)     .5 (*)     MCH 34.0 (*)     Neutrophils % 72.4 (*)     Lymphocytes % 16.9 (*)     All other components within normal limits   COMPREHENSIVE METABOLIC PANEL W/ REFLEX TO MG FOR LOW K - Abnormal; Notable for the following components:    Potassium reflex Magnesium 3.3 (*)     CO2 34 (*)     All other components within normal limits   BRAIN NATRIURETIC PEPTIDE - Abnormal; Notable for the following components:    Pro-BNP 8,001 (*)     All other components within normal limits   PROTIME-INR - Abnormal; Notable for the following components:    Protime 15.1 (*)     INR 1.19 (*)     All other components within normal limits   COVID-19, RAPID   APTT   D-DIMER, QUANTITATIVE   MAGNESIUM       All other labs were within normal range or not returned as of this dictation.     Medications   furosemide (LASIX) injection 60 mg (60 mg Intravenous Given 4/25/21 1520)   potassium chloride (KLOR-CON M) extended release tablet 40 mEq (40 mEq Oral Given 4/25/21 1520)   tiZANidine (ZANAFLEX) tablet 2 mg (2 mg Oral Given 4/25/21 1520)       EMERGENCY DEPARTMENT COURSE and DIFFERENTIALDIAGNOSIS/MDM:   Vitals:    Vitals:    04/25/21 1206 04/25/21 1400 04/25/21 1530   BP: (!) 142/87 (!) 142/78 (!) 144/80   Pulse: 86 81 80   Resp: 20 16 20   Temp: 97.9 °F (36.6 °C)  97.9 °F (36.6 °C)   TempSrc: Temporal  Temporal   SpO2: 94% 90% 91%   Weight: 144 lb (65.3 kg)     Height: 5' (1.524 m)         [facility]    ED Course as of Apr 25 1554   Sun Apr 25, 2021   1235 EKG shows sinus rhythm with a rate of 86. Evidence of LVH but no findings of acute ischemia or infarction. No evidence of acute right heart strain    [NANCY]      ED Course User Index  [NANCY] Heriberto Murrieta MD     Vitals wnl here w/o hypoxia. Labs unremarkable other than BNP 8000. CXR with chronic changes without florid pulmonary edema. D-dimer negative. EKg without ischemia. Pt given IV lasix here. Discussed further management options and pt agreeable for discharge with increased dose of lasix for several days with plan for pcp follow up and return precautions. Pt also given potassium for mild hypokalemia, along with low dose zanaflex as she reports muscle spasms when she takes lasix. Evaluation and work-up here revealed no signs of emergent or life-threatening pathology that would necessitate admission for further work-up or management at this time. Patient is felt to be stable for discharge home with return precautions for worsening of the condition or development of new concerning symptoms. Patient was encouraged to follow-up with their primary care doctor in the appropriate timeframe. Necessary prescriptions and information have been provided for treatment at home. Patient voices understanding and agreement with the plan. CONSULTS:  None    PROCEDURES:  Unless otherwise notedbelow, none     Procedures      FINAL IMPRESSION     1. Acute on chronic combined systolic and diastolic CHF (congestive heart failure) ([address]    2. Hypokalemia    3.  Nonrheumatic mitral valve regurgitation          DISPOSITION/PLAN   DISPOSITION Decision To Discharge 04/25/2021 03:47:34 PM      PATIENT REFERRED TO:  [facility]  [facility]  [phone]    If symptoms worsen    Kenneth Ying DO  [address] [address]  [phone]    Schedule an appointment as soon as possible for a visit in 3 days        DISCHARGE MEDICATIONS:  Discharge Medication List as of 4/25/2021  3:33 PM      START taking these medications    Details   potassium chloride (KLOR-CON M) 20 MEQ extended release tablet Take 2 tablets by mouth daily for 7 days, Disp-14 tablet, R-0Normal                (Please note that portions of this note were completed with a voice recognition program.  Efforts were made to edit the dictations butoccasionally words are mis-transcribed.)    Landon Nazario MD (electronically signed)  AttendingEmergency Physician          Landon Nazario., MD  04/25/21 0475

## 2021-04-25 NOTE — ED NOTES
Per MD instructions, informed patient to take Lasix 80mg PO BID for 5-7 days and double K+ PO meds 5-7 days. Follow up with PCP.      Reyes Silva, RN  04/25/21 7941

## 2021-04-25 NOTE — ED TRIAGE NOTES
Patient has increased shortness over the last few days, worse with exertion.   Patient took 2 lasix this am, with moderate relief

## 2021-04-26 LAB
EKG P AXIS: 29 DEGREES
EKG P-R INTERVAL: 168 MS
EKG Q-T INTERVAL: 378 MS
EKG QRS DURATION: 118 MS
EKG QTC CALCULATION (BAZETT): 423 MS
EKG T AXIS: 94 DEGREES

## 2021-04-26 PROCEDURE — 93010 ELECTROCARDIOGRAM REPORT: CPT | Performed by: INTERNAL MEDICINE

## 2021-04-30 ENCOUNTER — HOSPITAL ENCOUNTER (OUTPATIENT)
Dept: NON INVASIVE DIAGNOSTICS | Age: 81
Discharge: HOME | End: 2021-04-30
Payer: MEDICARE

## 2021-04-30 DIAGNOSIS — I34.0 MITRAL VALVE INSUFFICIENCY, UNSPECIFIED ETIOLOGY: ICD-10-CM

## 2021-04-30 LAB
LV EF: 43 %
LVEF MODALITY: NORMAL

## 2021-04-30 PROCEDURE — 93306 TTE W/DOPPLER COMPLETE: CPT

## 2021-05-03 ENCOUNTER — OFFICE VISIT (OUTPATIENT)
Dept: CARDIOLOGY CLINIC | Age: 81
End: 2021-05-03
Payer: MEDICARE

## 2021-05-03 VITALS
BODY MASS INDEX: 28.47 KG/M2 | HEART RATE: 70 BPM | DIASTOLIC BLOOD PRESSURE: 62 MMHG | WEIGHT: 145 LBS | HEIGHT: 60 IN | SYSTOLIC BLOOD PRESSURE: 128 MMHG

## 2021-05-03 DIAGNOSIS — I50.42 CHRONIC COMBINED SYSTOLIC AND DIASTOLIC CHF (CONGESTIVE HEART FAILURE) (HCC): Primary | ICD-10-CM

## 2021-05-03 DIAGNOSIS — I34.0 MODERATE MITRAL REGURGITATION: ICD-10-CM

## 2021-05-03 DIAGNOSIS — I35.1 MODERATE AORTIC INSUFFICIENCY: ICD-10-CM

## 2021-05-03 DIAGNOSIS — I10 ESSENTIAL HYPERTENSION: ICD-10-CM

## 2021-05-03 DIAGNOSIS — I07.1 MODERATE TRICUSPID REGURGITATION: ICD-10-CM

## 2021-05-03 DIAGNOSIS — I51.9 LEFT VENTRICULAR DYSFUNCTION: ICD-10-CM

## 2021-05-03 DIAGNOSIS — R06.02 SOB (SHORTNESS OF BREATH): ICD-10-CM

## 2021-05-03 PROCEDURE — 1123F ACP DISCUSS/DSCN MKR DOCD: CPT | Performed by: INTERNAL MEDICINE

## 2021-05-03 PROCEDURE — G8417 CALC BMI ABV UP PARAM F/U: HCPCS | Performed by: INTERNAL MEDICINE

## 2021-05-03 PROCEDURE — 1036F TOBACCO NON-USER: CPT | Performed by: INTERNAL MEDICINE

## 2021-05-03 PROCEDURE — 99214 OFFICE O/P EST MOD 30 MIN: CPT | Performed by: INTERNAL MEDICINE

## 2021-05-03 PROCEDURE — 4040F PNEUMOC VAC/ADMIN/RCVD: CPT | Performed by: INTERNAL MEDICINE

## 2021-05-03 PROCEDURE — G8427 DOCREV CUR MEDS BY ELIG CLIN: HCPCS | Performed by: INTERNAL MEDICINE

## 2021-05-03 PROCEDURE — 1090F PRES/ABSN URINE INCON ASSESS: CPT | Performed by: INTERNAL MEDICINE

## 2021-05-03 PROCEDURE — G8400 PT W/DXA NO RESULTS DOC: HCPCS | Performed by: INTERNAL MEDICINE

## 2021-05-03 ASSESSMENT — ENCOUNTER SYMPTOMS
NAUSEA: 0
SHORTNESS OF BREATH: 0
RESPIRATORY NEGATIVE: 1
EYES NEGATIVE: 1
GASTROINTESTINAL NEGATIVE: 1
VOMITING: 0
DIARRHEA: 0

## 2021-05-03 NOTE — PROGRESS NOTES
[facility] Progress Note                            Date:  5/3/2021  Patient: Tayler Crow  Age:  78 y.o., 1940      Reason for evaluation:         SUBJECTIVE:    Returns today follow-up assessment she had recent lab work 4/25/2021 went to the emergency department with volume overload she was taking Lasix 40 mg in the a.m. an additional 40 if she had weight gain they increased her Lasix to 80 twice daily she feels better. Dyspnea back to normal.  Echocardiogram 5 1 shows mild valvular regurgitation EF 40 to 45%. Denies chest pain she is watching her sodium content of her food intake. Blood pressure 128/62 heart 70. Weight today 145. Review of Systems   Constitutional: Negative. Negative for chills, fever and unexpected weight change. HENT: Negative. Eyes: Negative. Respiratory: Negative. Negative for shortness of breath. Cardiovascular: Negative. Negative for chest pain. Gastrointestinal: Negative. Negative for diarrhea, nausea and vomiting. Endocrine: Negative. Genitourinary: Negative. Musculoskeletal: Negative. Skin: Negative. Neurological: Negative. All other systems reviewed and are negative. OBJECTIVE:     /62   Pulse 70   Ht 5' (1.524 m)   Wt 145 lb (65.8 kg)   BMI 28.32 kg/m²     Labs:   CBC: No results for input(s): WBC, HGB, HCT, PLT in the last 72 hours. BMP:No results for input(s): NA, K, CO2, BUN, CREATININE, LABGLOM, GLUCOSE in the last 72 hours. BNP: No results for input(s): BNP in the last 72 hours. PT/INR: No results for input(s): PROTIME, INR in the last 72 hours. APTT:No results for input(s): APTT in the last 72 hours. CARDIAC ENZYMES:No results for input(s): CKTOTAL, CKMB, CKMBINDEX, TROPONINI in the last 72 hours. FASTING LIPID PANEL:No results found for: HDL, LDLDIRECT, LDLCALC, TRIG  LIVER PROFILE:No results for input(s): AST, ALT, LABALBU in the last 72 hours.         Past Medical History:   Diagnosis Date    Aortic insufficiency 12/04/13    Mild to moderate aortic insufficency.  Carcinoid tumor of ovary     Chronic combined systolic and diastolic congestive heart failure (HCC)     [facility] [address]     Diabetes ([facility] [address]     Essential hypertension 8/29/2017    H/O: hysterectomy     Hepatitis B     Hx of breast cancer     History of bilateral breast cancer.  Hypertension     Incisional hernia with obstruction     with mesh placement    Moderate aortic insufficiency 02/17/2016    Moderate mitral regurgitation 02/17/2016    Moderate to severe mitral regurgitation.  Moderate tricuspid regurgitation 02/17/2016    Ovarian cyst     Pelvic mass     Radiation     Skin cancer      Past Surgical History:   Procedure Laterality Date    BREAST SURGERY      C[facility]IAC [facility]  [facility]    EF 60%    HERNIA REPAIR      HYSTERECTOMY, TOTAL ABDOMINAL  12/2003    [facility] (negative for cancer).      Family History   Problem Relation Age of Onset    Heart Disease Mother     Diabetes Mother     Heart Disease Brother     Cancer Son         brain tumor     No Known Allergies  Current Outpatient Medications   Medication Sig Dispense Refill    potassium chloride (KLOR-CON M) 20 MEQ extended release tablet Take 2 tablets by mouth daily for 7 days 14 tablet 0    tiZANidine (ZANAFLEX) 2 MG tablet Take 1 tablet by mouth every 8 hours as needed (muscle spasm) 10 tablet 0    furosemide (LASIX) 80 MG tablet Take 80 mg by mouth as needed (Only takes when she gains extra weight)      Alendronate Sodium (FOSAMAX PO) Take by mouth every 14 days      Cyanocobalamin (VITAMIN B-12 PO) Take by mouth daily      ferrous sulfate 325 (65 Fe) MG tablet Take 325 mg by mouth 2 times daily      carvedilol (COREG) 25 MG tablet TAKE ONE TABLET BY MOUTH TWICE A DAY WITH MEALS 60 tablet 5    cloNIDine (CATAPRES) 0.1 MG tablet Take 1 tablet by mouth nightly 60 tablet 3    furosemide (LASIX) 40 MG tablet Take 40 mg by mouth daily      VIREAD 300 MG tablet Take 300 mg by mouth daily       omeprazole (PRILOSEC) 40 MG delayed release capsule 40 mg daily       HYDROcodone-acetaminophen (NORCO)  MG per tablet Take 1 tablet by mouth every 8 hours as needed       amitriptyline (ELAVIL) 25 MG tablet Take 25 mg by mouth nightly as needed.  cholestyramine (QUESTRAN) 4 G packet Take 4 g by mouth daily. No current facility-administered medications for this visit. Social History     Socioeconomic History    Marital status:       Spouse name: Not on file    Number of children: Not on file    Years of education: Not on file    Highest education level: Not on file   Occupational History    Not on file   Social Needs    Financial resource strain: Not on file    Food insecurity     Worry: Not on file     Inability: Not on file    Transportation needs     Medical: Not on file     Non-medical: Not on file   Tobacco Use    Smoking status: Never Smoker    Smokeless tobacco: Never Used   Substance and Sexual Activity    Alcohol use: No    Drug use: No    Sexual activity: Not on file   Lifestyle    Physical activity     Days per week: Not on file     Minutes per session: Not on file    Stress: Not on file   Relationships    Social connections     Talks on phone: Not on file     Gets together: Not on file     Attends Hinduism service: Not on file     Active member of club or organization: Not on file     Attends meetings of clubs or organizations: Not on file     Relationship status: Not on file    Intimate partner violence     Fear of current or ex partner: Not on file     Emotionally abused: Not on file     Physically abused: Not on file     Forced sexual activity: Not on file   Other Topics Concern    Not on file   Social History Narrative    Not on file       Physical Examination:  /62   Pulse 70   Ht 5' (1.524 m)   Wt 145 lb (65.8 kg)   BMI 28.32 kg/m²   Physical Exam  Vitals signs reviewed. Constitutional:       Appearance: She is well-developed. Neck:      Vascular: No carotid bruit or JVD. Cardiovascular:      Rate and Rhythm: Normal rate and regular rhythm. Heart sounds: Normal heart sounds. No murmur. No friction rub. No gallop. Pulmonary:      Effort: Pulmonary effort is normal. No respiratory distress. Breath sounds: Normal breath sounds. No wheezing or rales. Abdominal:      General: There is no distension. Tenderness: There is no abdominal tenderness. Lymphadenopathy:      Cervical: No cervical adenopathy. Skin:     General: Skin is warm and dry. ASSESSMENT:     Diagnosis Orders   1. Chronic combined systolic and diastolic CHF (congestive heart failure) ([address]     2. Left ventricular dysfunction     3. Moderate aortic insufficiency     4. Moderate mitral regurgitation     5. Moderate tricuspid regurgitation     6. Essential hypertension     7. SOB (shortness of breath)         PLAN:  No orders of the defined types were placed in this encounter. No orders of the defined types were placed in this encounter. 1. Continue present medications  2. Recommend that she take Lasix 80 mg every day every morning and take an additional 80 mg Lasix  if she has weight gain  3. Recommend follow-up assessment in 30 days    Return in about 4 weeks (around 5/31/2021) for return to Dr. Corin Augustine only. Brittnee Childress MD 5/3/2021 10:07 AM CDT    [facility]      Thisdictation was generated by voice recognition computer software. Although all attempts are made to edit the dictation for accuracy, there may be errors in the transcription that are not intended.

## 2021-06-07 ENCOUNTER — OFFICE VISIT (OUTPATIENT)
Dept: CARDIOLOGY CLINIC | Age: 81
End: 2021-06-07
Payer: MEDICARE

## 2021-06-07 VITALS
WEIGHT: 149.6 LBS | SYSTOLIC BLOOD PRESSURE: 138 MMHG | HEIGHT: 60 IN | HEART RATE: 78 BPM | DIASTOLIC BLOOD PRESSURE: 70 MMHG | BODY MASS INDEX: 29.37 KG/M2

## 2021-06-07 DIAGNOSIS — I35.1 AORTIC VALVE INSUFFICIENCY, ETIOLOGY OF CARDIAC VALVE DISEASE UNSPECIFIED: ICD-10-CM

## 2021-06-07 DIAGNOSIS — I07.1 MODERATE TRICUSPID REGURGITATION: ICD-10-CM

## 2021-06-07 DIAGNOSIS — I34.0 NONRHEUMATIC MITRAL VALVE REGURGITATION: ICD-10-CM

## 2021-06-07 DIAGNOSIS — I34.0 MODERATE MITRAL REGURGITATION: ICD-10-CM

## 2021-06-07 DIAGNOSIS — I10 ESSENTIAL HYPERTENSION: ICD-10-CM

## 2021-06-07 DIAGNOSIS — R06.02 SOB (SHORTNESS OF BREATH): ICD-10-CM

## 2021-06-07 DIAGNOSIS — I51.9 LEFT VENTRICULAR DYSFUNCTION: ICD-10-CM

## 2021-06-07 DIAGNOSIS — I50.42 CHRONIC COMBINED SYSTOLIC AND DIASTOLIC CHF (CONGESTIVE HEART FAILURE) (HCC): Primary | ICD-10-CM

## 2021-06-07 PROCEDURE — 99214 OFFICE O/P EST MOD 30 MIN: CPT | Performed by: INTERNAL MEDICINE

## 2021-06-07 PROCEDURE — G8417 CALC BMI ABV UP PARAM F/U: HCPCS | Performed by: INTERNAL MEDICINE

## 2021-06-07 PROCEDURE — 4040F PNEUMOC VAC/ADMIN/RCVD: CPT | Performed by: INTERNAL MEDICINE

## 2021-06-07 PROCEDURE — 1036F TOBACCO NON-USER: CPT | Performed by: INTERNAL MEDICINE

## 2021-06-07 PROCEDURE — 1123F ACP DISCUSS/DSCN MKR DOCD: CPT | Performed by: INTERNAL MEDICINE

## 2021-06-07 PROCEDURE — G8400 PT W/DXA NO RESULTS DOC: HCPCS | Performed by: INTERNAL MEDICINE

## 2021-06-07 PROCEDURE — G8427 DOCREV CUR MEDS BY ELIG CLIN: HCPCS | Performed by: INTERNAL MEDICINE

## 2021-06-07 PROCEDURE — 1090F PRES/ABSN URINE INCON ASSESS: CPT | Performed by: INTERNAL MEDICINE

## 2021-06-07 ASSESSMENT — ENCOUNTER SYMPTOMS
NAUSEA: 0
GASTROINTESTINAL NEGATIVE: 1
DIARRHEA: 0
EYES NEGATIVE: 1
VOMITING: 0
SHORTNESS OF BREATH: 0
RESPIRATORY NEGATIVE: 1

## 2021-06-07 NOTE — PROGRESS NOTES
[facility] Progress Note                            Date:  6/7/2021  Patient: Lissette Diaz  Age:  78 y.o., 1940      Reason for evaluation:         SUBJECTIVE:    Returns today follow-up assessment overall feeling well. Denies anginal chest pain she is a little bit mildly short of breath which is unchanged. Intermittent mild edema. Takes Lasix as directed. No other issues reported. Blood pressure 138 or 70 heart 78. Most recent echocardiogram from April 2021 reviewed. EF 40 to 45%. Review of Systems   Constitutional: Negative. Negative for chills, fever and unexpected weight change. HENT: Negative. Eyes: Negative. Respiratory: Negative. Negative for shortness of breath. Cardiovascular: Negative. Negative for chest pain. Gastrointestinal: Negative. Negative for diarrhea, nausea and vomiting. Endocrine: Negative. Genitourinary: Negative. Musculoskeletal: Negative. Skin: Negative. Neurological: Negative. All other systems reviewed and are negative. OBJECTIVE:     /70   Pulse 78   Ht 5' (1.524 m)   Wt 149 lb 9.6 oz (67.9 kg)   BMI 29.22 kg/m²     Labs:   CBC: No results for input(s): WBC, HGB, HCT, PLT in the last 72 hours. BMP:No results for input(s): NA, K, CO2, BUN, CREATININE, LABGLOM, GLUCOSE in the last 72 hours. BNP: No results for input(s): BNP in the last 72 hours. PT/INR: No results for input(s): PROTIME, INR in the last 72 hours. APTT:No results for input(s): APTT in the last 72 hours. CARDIAC ENZYMES:No results for input(s): CKTOTAL, CKMB, CKMBINDEX, TROPONINI in the last 72 hours. FASTING LIPID PANEL:No results found for: HDL, LDLDIRECT, LDLCALC, TRIG  LIVER PROFILE:No results for input(s): AST, ALT, LABALBU in the last 72 hours. Past Medical History:   Diagnosis Date    Aortic insufficiency 12/04/13    Mild to moderate aortic insufficency.     Carcinoid tumor of ovary     Chronic combined systolic and diastolic congestive heart failure (HCC)     [facility] [address] [facility]     Diabetes ([facility] [address] [facility]     Essential hypertension 8/29/2017    H/O: hysterectomy     Hepatitis B     Hx of breast cancer     History of bilateral breast cancer.  Hypertension     Incisional hernia with obstruction     with mesh placement    Moderate aortic insufficiency 02/17/2016    Moderate mitral regurgitation 02/17/2016    Moderate to severe mitral regurgitation.  Moderate tricuspid regurgitation 02/17/2016    Ovarian cyst     Pelvic mass     Radiation     Skin cancer      Past Surgical History:   Procedure Laterality Date    BREAST SURGERY      [facility]    EF 60%    HERNIA REPAIR      HYSTERECTOMY, TOTAL ABDOMINAL  12/2003    [facility] (negative for cancer).      Family History   Problem Relation Age of Onset    Heart Disease Mother     Diabetes Mother     Heart Disease Brother     Cancer Son         brain tumor     No Known Allergies  Current Outpatient Medications   Medication Sig Dispense Refill    tiZANidine (ZANAFLEX) 2 MG tablet Take 1 tablet by mouth every 8 hours as needed (muscle spasm) 10 tablet 0    furosemide (LASIX) 80 MG tablet Take 80 mg by mouth as needed (Only takes when she gains extra weight)      Alendronate Sodium (FOSAMAX PO) Take by mouth every 14 days      Cyanocobalamin (VITAMIN B-12 PO) Take by mouth daily      ferrous sulfate 325 (65 Fe) MG tablet Take 325 mg by mouth 2 times daily      carvedilol (COREG) 25 MG tablet TAKE ONE TABLET BY MOUTH TWICE A DAY WITH MEALS 60 tablet 5    cloNIDine (CATAPRES) 0.1 MG tablet Take 1 tablet by mouth nightly 60 tablet 3    furosemide (LASIX) 40 MG tablet Take 40 mg by mouth daily      VIREAD 300 MG tablet Take 300 mg by mouth daily       omeprazole (PRILOSEC) 40 MG delayed release capsule 40 mg daily       HYDROcodone-acetaminophen (NORCO)  MG per tablet Take 1 tablet by mouth every 8 hours as needed       amitriptyline (ELAVIL) 25 MG tablet Take 25 mg by mouth nightly as needed.  cholestyramine (QUESTRAN) 4 G packet Take 4 g by mouth daily.  potassium chloride (KLOR-CON M) 20 MEQ extended release tablet Take 2 tablets by mouth daily for 7 days 14 tablet 0     No current facility-administered medications for this visit. Social History     Socioeconomic History    Marital status:      Spouse name: Not on file    Number of children: Not on file    Years of education: Not on file    Highest education level: Not on file   Occupational History    Not on file   Tobacco Use    Smoking status: Never Smoker    Smokeless tobacco: Never Used   Vaping Use    Vaping Use: Never used   Substance and Sexual Activity    Alcohol use: No    Drug use: No    Sexual activity: Not on file   Other Topics Concern    Not on file   Social History Narrative    Not on file     Social Determinants of Health     Financial Resource Strain:     Difficulty of Paying Living Expenses:    Food Insecurity:     Worried About Running Out of Food in the Last Year:     [address] in the Last Year:    Transportation Needs:     Lack of Transportation (Medical):  Lack of Transportation (Non-Medical):    Physical Activity:     Days of Exercise per Week:     Minutes of Exercise per Session:    Stress:     Feeling of Stress :    Social Connections:     Frequency of Communication with Friends and Family:     Frequency of Social Gatherings with Friends and Family:     Attends L[employer] Services:     Active Member of Clubs or Organizations:     Attends Club or Organization Meetings:     Marital Status:    Intimate Partner Violence:     Fear of Current or Ex-Partner:     Emotionally Abused:     Physically Abused:     Sexually Abused:        Physical Examination:  /70   Pulse 78   Ht 5' (1.524 m)   Wt 149 lb 9.6 oz (67.9 kg)   BMI 29.22 kg/m²   Physical Exam  Vitals reviewed.    Constitutional:       Appearance: She is well-developed. Neck:      Vascular: No carotid bruit or JVD. Cardiovascular:      Rate and Rhythm: Normal rate and regular rhythm. Heart sounds: Normal heart sounds. No murmur heard. No friction rub. No gallop. Pulmonary:      Effort: Pulmonary effort is normal. No respiratory distress. Breath sounds: Normal breath sounds. No wheezing or rales. Abdominal:      General: There is no distension. Tenderness: There is no abdominal tenderness. Lymphadenopathy:      Cervical: No cervical adenopathy. Skin:     General: Skin is warm and dry. ASSESSMENT:     Diagnosis Orders   1. Chronic combined systolic and diastolic CHF (congestive heart failure) ([address]     2. Left ventricular dysfunction     3. Nonrheumatic mitral valve regurgitation     4. Moderate mitral regurgitation     5. Moderate tricuspid regurgitation     6. Essential hypertension     7. SOB (shortness of breath)     8. Aortic valve insufficiency, etiology of cardiac valve disease unspecified         PLAN:  No orders of the defined types were placed in this encounter. No orders of the defined types were placed in this encounter. 1. Continue present medications  2. Recommend follow-up assessment in 6 months    Return in about 6 months (around 12/7/2021) for return to Dr. Twila Sun only. Malick Luis MD 6/7/2021 2:22 PM CDT    [facility]      Thisdictation was generated by voice recognition computer software. Although all attempts are made to edit the dictation for accuracy, there may be errors in the transcription that are not intended.

## 2021-12-13 ENCOUNTER — OFFICE VISIT (OUTPATIENT)
Dept: CARDIOLOGY CLINIC | Age: 81
End: 2021-12-13
Payer: MEDICARE

## 2021-12-13 VITALS
SYSTOLIC BLOOD PRESSURE: 122 MMHG | BODY MASS INDEX: 28.47 KG/M2 | HEART RATE: 88 BPM | DIASTOLIC BLOOD PRESSURE: 70 MMHG | WEIGHT: 145 LBS | HEIGHT: 60 IN

## 2021-12-13 DIAGNOSIS — R06.02 SOB (SHORTNESS OF BREATH): ICD-10-CM

## 2021-12-13 DIAGNOSIS — I34.0 MODERATE MITRAL REGURGITATION: ICD-10-CM

## 2021-12-13 DIAGNOSIS — I07.1 MODERATE TRICUSPID REGURGITATION: ICD-10-CM

## 2021-12-13 DIAGNOSIS — I35.1 MODERATE AORTIC INSUFFICIENCY: ICD-10-CM

## 2021-12-13 DIAGNOSIS — I50.42 CHRONIC COMBINED SYSTOLIC AND DIASTOLIC CHF (CONGESTIVE HEART FAILURE) (HCC): Primary | ICD-10-CM

## 2021-12-13 DIAGNOSIS — I10 PRIMARY HYPERTENSION: ICD-10-CM

## 2021-12-13 PROCEDURE — 99214 OFFICE O/P EST MOD 30 MIN: CPT | Performed by: INTERNAL MEDICINE

## 2021-12-13 PROCEDURE — G8417 CALC BMI ABV UP PARAM F/U: HCPCS | Performed by: INTERNAL MEDICINE

## 2021-12-13 PROCEDURE — 1036F TOBACCO NON-USER: CPT | Performed by: INTERNAL MEDICINE

## 2021-12-13 PROCEDURE — G8484 FLU IMMUNIZE NO ADMIN: HCPCS | Performed by: INTERNAL MEDICINE

## 2021-12-13 PROCEDURE — G8400 PT W/DXA NO RESULTS DOC: HCPCS | Performed by: INTERNAL MEDICINE

## 2021-12-13 PROCEDURE — G8427 DOCREV CUR MEDS BY ELIG CLIN: HCPCS | Performed by: INTERNAL MEDICINE

## 2021-12-13 PROCEDURE — 4040F PNEUMOC VAC/ADMIN/RCVD: CPT | Performed by: INTERNAL MEDICINE

## 2021-12-13 PROCEDURE — 1090F PRES/ABSN URINE INCON ASSESS: CPT | Performed by: INTERNAL MEDICINE

## 2021-12-13 PROCEDURE — 1123F ACP DISCUSS/DSCN MKR DOCD: CPT | Performed by: INTERNAL MEDICINE

## 2021-12-13 RX ORDER — CARVEDILOL 12.5 MG/1
TABLET ORAL
Qty: 60 TABLET | Refills: 11 | Status: SHIPPED | OUTPATIENT
Start: 2021-12-13 | End: 2022-09-26 | Stop reason: DRUGHIGH

## 2021-12-13 RX ORDER — LISINOPRIL 10 MG/1
10 TABLET ORAL NIGHTLY
Qty: 30 TABLET | Refills: 5 | Status: SHIPPED | OUTPATIENT
Start: 2021-12-13 | End: 2022-05-11

## 2021-12-13 ASSESSMENT — ENCOUNTER SYMPTOMS
GASTROINTESTINAL NEGATIVE: 1
NAUSEA: 0
EYES NEGATIVE: 1
RESPIRATORY NEGATIVE: 1
VOMITING: 0
DIARRHEA: 0
SHORTNESS OF BREATH: 0

## 2021-12-13 NOTE — PROGRESS NOTES
[facility] Progress Note                            Date:  12/13/2021  Patient: Shannan Delgado  Age:  78 y.o., 1940      Reason for evaluation:         SUBJECTIVE:    Returns today follow-up assessment follow-up for congestive heart failure reports he gets out of breath easily but not changed denies chest discomfort. Has mild pedal edema. Last echocardiogram 4/30/2021 showed ejection fraction 40 to 45% mild AR mild MR. On Coreg which she states she is only taking once a day she thinks she will check when she gets home. On Lasix 80 p.o. twice daily. No other complaints or issues reported. Blood pressure 122/70 heart 88. Review of Systems   Constitutional: Negative. Negative for chills, fever and unexpected weight change. HENT: Negative. Eyes: Negative. Respiratory: Negative. Negative for shortness of breath. Cardiovascular: Negative. Negative for chest pain. Gastrointestinal: Negative. Negative for diarrhea, nausea and vomiting. Endocrine: Negative. Genitourinary: Negative. Musculoskeletal: Negative. Skin: Negative. Neurological: Negative. All other systems reviewed and are negative. OBJECTIVE:     /70   Pulse 88   Ht 5' (1.524 m)   Wt 145 lb (65.8 kg)   BMI 28.32 kg/m²     Labs:   CBC: No results for input(s): WBC, HGB, HCT, PLT in the last 72 hours. BMP:No results for input(s): NA, K, CO2, BUN, CREATININE, LABGLOM, GLUCOSE in the last 72 hours. BNP: No results for input(s): BNP in the last 72 hours. PT/INR: No results for input(s): PROTIME, INR in the last 72 hours. APTT:No results for input(s): APTT in the last 72 hours. CARDIAC ENZYMES:No results for input(s): CKTOTAL, CKMB, CKMBINDEX, TROPONINI in the last 72 hours. FASTING LIPID PANEL:No results found for: HDL, LDLDIRECT, LDLCALC, TRIG  LIVER PROFILE:No results for input(s): AST, ALT, LABALBU in the last 72 hours.         Past Medical History:   Diagnosis Date    Aortic insufficiency 12/04/13    Mild to moderate aortic insufficency.  Carcinoid tumor of ovary     Chronic combined systolic and diastolic congestive heart failure (HCC)     [facility] [address]     Diabetes ([facility] [address] [facility]     Essential hypertension 8/29/2017    H/O: hysterectomy     Hepatitis B     Hx of breast cancer     History of bilateral breast cancer.  Hypertension     Incisional hernia with obstruction     with mesh placement    Moderate aortic insufficiency 02/17/2016    Moderate mitral regurgitation 02/17/2016    Moderate to severe mitral regurgitation.  Moderate tricuspid regurgitation 02/17/2016    Ovarian cyst     Pelvic mass     Radiation     Skin cancer      Past Surgical History:   Procedure Laterality Date    BREAST SURGERY      [facility]    EF 60%    HERNIA REPAIR      HYSTERECTOMY, TOTAL ABDOMINAL  12/2003    [facility] (negative for cancer).      Family History   Problem Relation Age of Onset    Heart Disease Mother     Diabetes Mother     Heart Disease Brother     Cancer Son         brain tumor     No Known Allergies  Current Outpatient Medications   Medication Sig Dispense Refill    lisinopril (PRINIVIL;ZESTRIL) 10 MG tablet Take 1 tablet by mouth nightly 30 tablet 5    tiZANidine (ZANAFLEX) 2 MG tablet Take 1 tablet by mouth every 8 hours as needed (muscle spasm) 10 tablet 0    furosemide (LASIX) 80 MG tablet Take 80 mg by mouth 2 times daily       Cyanocobalamin (VITAMIN B-12 PO) Take by mouth daily      ferrous sulfate 325 (65 Fe) MG tablet Take 325 mg by mouth 2 times daily      carvedilol (COREG) 25 MG tablet TAKE ONE TABLET BY MOUTH TWICE A DAY WITH MEALS 60 tablet 5    VIREAD 300 MG tablet Take 300 mg by mouth daily       omeprazole (PRILOSEC) 40 MG delayed release capsule 40 mg daily       HYDROcodone-acetaminophen (NORCO)  MG per tablet Take 1 tablet by mouth every 8 hours as needed       amitriptyline (ELAVIL) 25 MG tablet Take 25 mg by mouth nightly as needed.  cholestyramine (QUESTRAN) 4 G packet Take 4 g by mouth daily.  potassium chloride (KLOR-CON M) 20 MEQ extended release tablet Take 2 tablets by mouth daily for 7 days 14 tablet 0    Alendronate Sodium (FOSAMAX PO) Take by mouth every 14 days (Patient not taking: Reported on 12/13/2021)      cloNIDine (CATAPRES) 0.1 MG tablet Take 1 tablet by mouth nightly (Patient not taking: Reported on 12/13/2021) 60 tablet 3    furosemide (LASIX) 40 MG tablet Take 40 mg by mouth daily (Patient not taking: Reported on 12/13/2021)       No current facility-administered medications for this visit. Social History     Socioeconomic History    Marital status:      Spouse name: Not on file    Number of children: Not on file    Years of education: Not on file    Highest education level: Not on file   Occupational History    Not on file   Tobacco Use    Smoking status: Never Smoker    Smokeless tobacco: Never Used   Vaping Use    Vaping Use: Never used   Substance and Sexual Activity    Alcohol use: No    Drug use: No    Sexual activity: Not on file   Other Topics Concern    Not on file   Social History Narrative    Not on file     Social Determinants of Health     Financial Resource Strain:     Difficulty of Paying Living Expenses: Not on file   Food Insecurity:     Worried About Running Out of Food in the Last Year: Not on file    Severiano of Food in the Last Year: Not on file   Transportation Needs:     Lack of Transportation (Medical): Not on file    Lack of Transportation (Non-Medical):  Not on file   Physical Activity:     Days of Exercise per Week: Not on file    Minutes of Exercise per Session: Not on file   Stress:     Feeling of Stress : Not on file   Social Connections:     Frequency of Communication with Friends and Family: Not on file    Frequency of Social Gatherings with Friends and Family: Not on file    Attends Rastafarian Services: Not on Orders Placed This Encounter   Medications    lisinopril (PRINIVIL;ZESTRIL) 10 MG tablet     Sig: Take 1 tablet by mouth nightly     Dispense:  30 tablet     Refill:  5         1. Continue present medications  2. Recommend follow-up assessment in 6 months  3. Recommend lisinopril 10 mg daily    Return in about 6 months (around 6/13/2022) for return to Dr. Radha Blake only. Jorden Mcdermott MD 12/13/2021 1:11 PM [facility]      Thisdictation was generated by voice recognition computer software. Although all attempts are made to edit the dictation for accuracy, there may be errors in the transcription that are not intended.

## 2021-12-20 DIAGNOSIS — I35.1 MODERATE AORTIC INSUFFICIENCY: ICD-10-CM

## 2021-12-20 DIAGNOSIS — I34.0 MODERATE MITRAL REGURGITATION: ICD-10-CM

## 2021-12-20 DIAGNOSIS — I10 PRIMARY HYPERTENSION: ICD-10-CM

## 2021-12-20 DIAGNOSIS — R06.02 SOB (SHORTNESS OF BREATH): ICD-10-CM

## 2021-12-20 DIAGNOSIS — I07.1 MODERATE TRICUSPID REGURGITATION: ICD-10-CM

## 2021-12-20 DIAGNOSIS — I50.42 CHRONIC COMBINED SYSTOLIC AND DIASTOLIC CHF (CONGESTIVE HEART FAILURE) (HCC): ICD-10-CM

## 2021-12-20 LAB
ANION GAP SERPL CALCULATED.3IONS-SCNC: 12 MMOL/L (ref 7–19)
BUN BLDV-MCNC: 21 MG/DL (ref 8–23)
CALCIUM SERPL-MCNC: 9.5 MG/DL (ref 8.8–10.2)
CHLORIDE BLD-SCNC: 104 MMOL/L (ref 98–111)
CO2: 27 MMOL/L (ref 22–29)
CREAT SERPL-MCNC: 0.8 MG/DL (ref 0.5–0.9)
GFR AFRICAN AMERICAN: >59
GFR NON-AFRICAN AMERICAN: >60
GLUCOSE BLD-MCNC: 113 MG/DL (ref 74–109)
POTASSIUM SERPL-SCNC: 4.2 MMOL/L (ref 3.5–5)
PRO-BNP: 719 PG/ML (ref 0–1800)
SODIUM BLD-SCNC: 143 MMOL/L (ref 136–145)

## 2022-05-11 RX ORDER — LISINOPRIL 10 MG/1
10 TABLET ORAL NIGHTLY
Qty: 30 TABLET | Refills: 5 | Status: SHIPPED | OUTPATIENT
Start: 2022-05-11

## 2022-06-20 ENCOUNTER — OFFICE VISIT (OUTPATIENT)
Dept: CARDIOLOGY CLINIC | Age: 82
End: 2022-06-20
Payer: MEDICARE

## 2022-06-20 VITALS
HEIGHT: 60 IN | WEIGHT: 146 LBS | SYSTOLIC BLOOD PRESSURE: 112 MMHG | HEART RATE: 77 BPM | BODY MASS INDEX: 28.66 KG/M2 | DIASTOLIC BLOOD PRESSURE: 60 MMHG

## 2022-06-20 DIAGNOSIS — I50.42 CHRONIC COMBINED SYSTOLIC AND DIASTOLIC CHF (CONGESTIVE HEART FAILURE) (HCC): Primary | ICD-10-CM

## 2022-06-20 DIAGNOSIS — I07.1 MODERATE TRICUSPID REGURGITATION: ICD-10-CM

## 2022-06-20 DIAGNOSIS — I35.1 MODERATE AORTIC INSUFFICIENCY: ICD-10-CM

## 2022-06-20 DIAGNOSIS — I34.0 MODERATE MITRAL REGURGITATION: ICD-10-CM

## 2022-06-20 DIAGNOSIS — R06.02 SOB (SHORTNESS OF BREATH): ICD-10-CM

## 2022-06-20 DIAGNOSIS — I51.9 LEFT VENTRICULAR DYSFUNCTION: ICD-10-CM

## 2022-06-20 DIAGNOSIS — I10 PRIMARY HYPERTENSION: ICD-10-CM

## 2022-06-20 DIAGNOSIS — R06.09 DYSPNEA ON EXERTION: ICD-10-CM

## 2022-06-20 DIAGNOSIS — I50.42 CHRONIC COMBINED SYSTOLIC AND DIASTOLIC CHF (CONGESTIVE HEART FAILURE) (HCC): ICD-10-CM

## 2022-06-20 LAB
ANION GAP SERPL CALCULATED.3IONS-SCNC: 11 MMOL/L (ref 7–19)
BUN BLDV-MCNC: 24 MG/DL (ref 8–23)
CALCIUM SERPL-MCNC: 9.2 MG/DL (ref 8.8–10.2)
CHLORIDE BLD-SCNC: 106 MMOL/L (ref 98–111)
CO2: 26 MMOL/L (ref 22–29)
CREAT SERPL-MCNC: 0.9 MG/DL (ref 0.5–0.9)
GFR AFRICAN AMERICAN: >59
GFR NON-AFRICAN AMERICAN: 60
GLUCOSE BLD-MCNC: 133 MG/DL (ref 74–109)
POTASSIUM SERPL-SCNC: 4.2 MMOL/L (ref 3.5–5)
PRO-BNP: 607 PG/ML (ref 0–1800)
SODIUM BLD-SCNC: 143 MMOL/L (ref 136–145)

## 2022-06-20 PROCEDURE — 99214 OFFICE O/P EST MOD 30 MIN: CPT | Performed by: INTERNAL MEDICINE

## 2022-06-20 PROCEDURE — G8427 DOCREV CUR MEDS BY ELIG CLIN: HCPCS | Performed by: INTERNAL MEDICINE

## 2022-06-20 PROCEDURE — G8400 PT W/DXA NO RESULTS DOC: HCPCS | Performed by: INTERNAL MEDICINE

## 2022-06-20 PROCEDURE — G8417 CALC BMI ABV UP PARAM F/U: HCPCS | Performed by: INTERNAL MEDICINE

## 2022-06-20 PROCEDURE — 1123F ACP DISCUSS/DSCN MKR DOCD: CPT | Performed by: INTERNAL MEDICINE

## 2022-06-20 PROCEDURE — 1036F TOBACCO NON-USER: CPT | Performed by: INTERNAL MEDICINE

## 2022-06-20 PROCEDURE — 1090F PRES/ABSN URINE INCON ASSESS: CPT | Performed by: INTERNAL MEDICINE

## 2022-06-20 ASSESSMENT — ENCOUNTER SYMPTOMS
NAUSEA: 0
SHORTNESS OF BREATH: 0
RESPIRATORY NEGATIVE: 1
DIARRHEA: 0
VOMITING: 0
EYES NEGATIVE: 1
GASTROINTESTINAL NEGATIVE: 1

## 2022-06-20 NOTE — PROGRESS NOTES
[facility] Progress Note                            Date:  6/20/2022  Patient: Chuy Mccollum  Age:  78 y.o., 1940      Reason for evaluation:         SUBJECTIVE:    Returns today follow-up assessment followed for congestive heart failure and cardiomyopathy and valvular dysfunction. Blood pressure 112/60 heart 77. Reports her dyspnea improved and starting lisinopril no complaints denies chest pain. Review of Systems   Constitutional: Negative. Negative for chills, fever and unexpected weight change. HENT: Negative. Eyes: Negative. Respiratory: Negative. Negative for shortness of breath. Cardiovascular: Negative. Negative for chest pain. Gastrointestinal: Negative. Negative for diarrhea, nausea and vomiting. Endocrine: Negative. Genitourinary: Negative. Musculoskeletal: Negative. Skin: Negative. Neurological: Negative. All other systems reviewed and are negative. OBJECTIVE:     /60   Pulse 77   Ht 5' (1.524 m)   Wt 146 lb (66.2 kg)   BMI 28.51 kg/m²     Labs:   CBC: No results for input(s): WBC, HGB, HCT, PLT in the last 72 hours. BMP:No results for input(s): NA, K, CO2, BUN, CREATININE, LABGLOM, GLUCOSE in the last 72 hours. BNP: No results for input(s): BNP in the last 72 hours. PT/INR: No results for input(s): PROTIME, INR in the last 72 hours. APTT:No results for input(s): APTT in the last 72 hours. CARDIAC ENZYMES:No results for input(s): CKTOTAL, CKMB, CKMBINDEX, TROPONINI in the last 72 hours. FASTING LIPID PANEL:No results found for: HDL, LDLDIRECT, LDLCALC, TRIG  LIVER PROFILE:No results for input(s): AST, ALT, LABALBU in the last 72 hours. Past Medical History:   Diagnosis Date    Aortic insufficiency 12/04/13    Mild to moderate aortic insufficency.     Carcinoid tumor of ovary     Chronic combined systolic and diastolic congestive heart failure (HCC)     [facility] [address]     Diabetes ([facility] [address]     Essential hypertension 8/29/2017    H/O: hysterectomy     Hepatitis B     Hx of breast cancer     History of bilateral breast cancer.  Hypertension     Incisional hernia with obstruction     with mesh placement    Moderate aortic insufficiency 02/17/2016    Moderate mitral regurgitation 02/17/2016    Moderate to severe mitral regurgitation.  Moderate tricuspid regurgitation 02/17/2016    Ovarian cyst     Pelvic mass     Radiation     Skin cancer      Past Surgical History:   Procedure Laterality Date    BREAST SURGERY      CARDIAC CATHETERIZATION  12/4/13  [address]    EF 60%    HERNIA REPAIR      HYSTERECTOMY, TOTAL ABDOMINAL (CERVIX REMOVED)  12/2003    [facility] (negative for cancer). Family History   Problem Relation Age of Onset    Heart Disease Mother     Diabetes Mother     Heart Disease Brother     Cancer Son         brain tumor     No Known Allergies  Current Outpatient Medications   Medication Sig Dispense Refill    lisinopril (PRINIVIL;ZESTRIL) 10 MG tablet TAKE 1 TABLET BY MOUTH NIGHTLY 30 tablet 5    furosemide (LASIX) 80 MG tablet Take 80 mg by mouth 2 times daily       carvedilol (COREG) 25 MG tablet TAKE ONE TABLET BY MOUTH TWICE A DAY WITH MEALS 60 tablet 5    cloNIDine (CATAPRES) 0.1 MG tablet Take 1 tablet by mouth nightly 60 tablet 3    omeprazole (PRILOSEC) 40 MG delayed release capsule 40 mg daily       HYDROcodone-acetaminophen (NORCO)  MG per tablet Take 1 tablet by mouth every 8 hours as needed       amitriptyline (ELAVIL) 25 MG tablet Take 25 mg by mouth nightly as needed.  cholestyramine (QUESTRAN) 4 G packet Take 4 g by mouth daily.       carvedilol (COREG) 12.5 MG tablet TAKE ONE TABLET BY MOUTH DAILY (Patient not taking: Reported on 6/20/2022) 60 tablet 11    potassium chloride (KLOR-CON M) 20 MEQ extended release tablet Take 2 tablets by mouth daily for 7 days 14 tablet 0    tiZANidine (ZANAFLEX) 2 MG tablet Take 1 tablet by mouth every 8 hours as needed (muscle spasm) (Patient not taking: Reported on 6/20/2022) 10 tablet 0    Alendronate Sodium (FOSAMAX PO) Take by mouth every 14 days (Patient not taking: Reported on 12/13/2021)      Cyanocobalamin (VITAMIN B-12 PO) Take by mouth daily (Patient not taking: Reported on 6/20/2022)      ferrous sulfate 325 (65 Fe) MG tablet Take 325 mg by mouth 2 times daily (Patient not taking: Reported on 6/20/2022)      furosemide (LASIX) 40 MG tablet Take 40 mg by mouth daily (Patient not taking: Reported on 12/13/2021)      VIREAD 300 MG tablet Take 300 mg by mouth daily        No current facility-administered medications for this visit. Social History     Socioeconomic History    Marital status:      Spouse name: Not on file    Number of children: Not on file    Years of education: Not on file    Highest education level: Not on file   Occupational History    Not on file   Tobacco Use    Smoking status: Never Smoker    Smokeless tobacco: Never Used   Vaping Use    Vaping Use: Never used   Substance and Sexual Activity    Alcohol use: No    Drug use: No    Sexual activity: Not on file   Other Topics Concern    Not on file   Social History Narrative    Not on file     Social Determinants of Health     Financial Resource Strain:     Difficulty of Paying Living Expenses: Not on file   Food Insecurity:     Worried About Running Out of Food in the Last Year: Not on file    Severiano of Food in the Last Year: Not on file   Transportation Needs:     Lack of Transportation (Medical): Not on file    Lack of Transportation (Non-Medical):  Not on file   Physical Activity:     Days of Exercise per Week: Not on file    Minutes of Exercise per Session: Not on file   Stress:     Feeling of Stress : Not on file   Social Connections:     Frequency of Communication with Friends and Family: Not on file    Frequency of Social Gatherings with Friends and Family: Not on file    Attends Nondenominational Services: Not on file   [facility] [employer] or Organizations: Not on file    Attends Club or Organization Meetings: Not on file    Marital Status: Not on file   Intimate Partner Violence:     Fear of Current or Ex-Partner: Not on file    Emotionally Abused: Not on file    Physically Abused: Not on file    Sexually Abused: Not on file   Housing Stability:     Unable to Pay for Housing in the Last Year: Not on file    Number of [place] in the Last Year: Not on file    Unstable Housing in the Last Year: Not on file       Physical Examination:  /60   Pulse 77   Ht 5' (1.524 m)   Wt 146 lb (66.2 kg)   BMI 28.51 kg/m²   Physical Exam  Vitals reviewed. Constitutional:       Appearance: She is well-developed. Neck:      Vascular: No carotid bruit or JVD. Cardiovascular:      Rate and Rhythm: Normal rate and regular rhythm. Heart sounds: Normal heart sounds. No murmur heard. No friction rub. No gallop. Pulmonary:      Effort: Pulmonary effort is normal. No respiratory distress. Breath sounds: Normal breath sounds. No wheezing or rales. Abdominal:      General: There is no distension. Tenderness: There is no abdominal tenderness. Lymphadenopathy:      Cervical: No cervical adenopathy. Skin:     General: Skin is warm and dry. ASSESSMENT:     Diagnosis Orders   1. Chronic combined systolic and diastolic CHF (congestive heart failure) (HCC)  Basic Metabolic Panel    BNP   2. Moderate mitral regurgitation  Basic Metabolic Panel    BNP   3. Moderate tricuspid regurgitation  Basic Metabolic Panel    BNP   4. Primary hypertension  Basic Metabolic Panel    BNP   5. SOB (shortness of breath)  Basic Metabolic Panel    BNP   6. Moderate aortic insufficiency  Basic Metabolic Panel    BNP   7. Left ventricular dysfunction  Basic Metabolic Panel    BNP   8.  Dyspnea on exertion  Basic Metabolic Panel    BNP       PLAN:  Orders Placed This Encounter   Procedures    Basic Metabolic Panel    BNP     No orders of the defined types were placed in this encounter. 1. Continue present medications  2. Laboratory studies as ordered  3. Recommend follow-up assessment in 3 months    Return in about 3 months (around 9/20/2022) for return to Dr. Elza Funez only. Rolando Martin MD 6/20/2022 3:09 PM CDT    [id number] [facility]      Thisdictation was generated by voice recognition computer software. Although all attempts are made to edit the dictation for accuracy, there may be errors in the transcription that are not intended.

## 2022-09-23 ENCOUNTER — TELEPHONE (OUTPATIENT)
Dept: CARDIOLOGY CLINIC | Age: 82
End: 2022-09-23

## 2022-09-26 ENCOUNTER — OFFICE VISIT (OUTPATIENT)
Dept: CARDIOLOGY CLINIC | Age: 82
End: 2022-09-26
Payer: MEDICARE

## 2022-09-26 VITALS
WEIGHT: 143 LBS | BODY MASS INDEX: 28.07 KG/M2 | HEIGHT: 60 IN | DIASTOLIC BLOOD PRESSURE: 64 MMHG | SYSTOLIC BLOOD PRESSURE: 106 MMHG | HEART RATE: 68 BPM

## 2022-09-26 DIAGNOSIS — I34.0 MODERATE MITRAL REGURGITATION: ICD-10-CM

## 2022-09-26 DIAGNOSIS — I35.1 MODERATE AORTIC INSUFFICIENCY: ICD-10-CM

## 2022-09-26 DIAGNOSIS — I50.42 CHRONIC COMBINED SYSTOLIC AND DIASTOLIC CHF (CONGESTIVE HEART FAILURE) (HCC): Primary | ICD-10-CM

## 2022-09-26 DIAGNOSIS — R06.02 SOB (SHORTNESS OF BREATH): ICD-10-CM

## 2022-09-26 DIAGNOSIS — I07.1 MODERATE TRICUSPID REGURGITATION: ICD-10-CM

## 2022-09-26 DIAGNOSIS — I51.9 LEFT VENTRICULAR DYSFUNCTION: ICD-10-CM

## 2022-09-26 DIAGNOSIS — I10 PRIMARY HYPERTENSION: ICD-10-CM

## 2022-09-26 PROCEDURE — 99213 OFFICE O/P EST LOW 20 MIN: CPT | Performed by: INTERNAL MEDICINE

## 2022-09-26 PROCEDURE — 1123F ACP DISCUSS/DSCN MKR DOCD: CPT | Performed by: INTERNAL MEDICINE

## 2022-09-26 PROCEDURE — 93000 ELECTROCARDIOGRAM COMPLETE: CPT | Performed by: INTERNAL MEDICINE

## 2022-09-26 ASSESSMENT — ENCOUNTER SYMPTOMS
VOMITING: 0
GASTROINTESTINAL NEGATIVE: 1
SHORTNESS OF BREATH: 0
EYES NEGATIVE: 1
DIARRHEA: 0
RESPIRATORY NEGATIVE: 1
NAUSEA: 0

## 2022-09-26 NOTE — PROGRESS NOTES
[facility] Progress Note                            Date:  9/26/2022  Patient: Pedro Bronson  Age:  78 y.o., 1940      Reason for evaluation:         SUBJECTIVE:    Returns today follow-up assessment overall doing well gets tired and out of breath but symptoms unchanged since her last visit denies anginal chest pain. EKG tracing today sinus rhythm left axis deviation voltage criteria for LVH nonspecific ST and T wave abnormalities. Blood pressure 106/64 heart rate 68. Review of Systems   Constitutional: Negative. Negative for chills, fever and unexpected weight change. HENT: Negative. Eyes: Negative. Respiratory: Negative. Negative for shortness of breath. Cardiovascular: Negative. Negative for chest pain. Gastrointestinal: Negative. Negative for diarrhea, nausea and vomiting. Endocrine: Negative. Genitourinary: Negative. Musculoskeletal: Negative. Skin: Negative. Neurological: Negative. All other systems reviewed and are negative. OBJECTIVE:     /64   Pulse 68   Ht 5' (1.524 m)   Wt 143 lb (64.9 kg)   BMI 27.93 kg/m²     Labs:   CBC: No results for input(s): WBC, HGB, HCT, PLT in the last 72 hours. BMP:No results for input(s): NA, K, CO2, BUN, CREATININE, LABGLOM, GLUCOSE in the last 72 hours. BNP: No results for input(s): BNP in the last 72 hours. PT/INR: No results for input(s): PROTIME, INR in the last 72 hours. APTT:No results for input(s): APTT in the last 72 hours. CARDIAC ENZYMES:No results for input(s): CKTOTAL, CKMB, CKMBINDEX, TROPONINI in the last 72 hours. FASTING LIPID PANEL:No results found for: HDL, LDLDIRECT, LDLCALC, TRIG  LIVER PROFILE:No results for input(s): AST, ALT, LABALBU in the last 72 hours. Past Medical History:   Diagnosis Date    Aortic insufficiency 12/04/13    Mild to moderate aortic insufficency.     Carcinoid tumor of ovary     Chronic combined systolic and diastolic congestive heart failure ([address] aortic insufficiency            PLAN:  Orders Placed This Encounter   Procedures    EKG 12 lead     No orders of the defined types were placed in this encounter. Continue present medications  Recommend follow-up assessment in 6 months    Return in about 6 months (around 3/26/2023) for return to Dr. Timo Moncada only. Andrew Toledo MD 9/26/2022 2:17 PM CDT    [facility]      Thisdictation was generated by voice recognition computer software. Although all attempts are made to edit the dictation for accuracy, there may be errors in the transcription that are not intended.

## 2022-10-11 RX ORDER — CARVEDILOL 12.5 MG/1
TABLET ORAL
Qty: 60 TABLET | Refills: 11 | Status: SHIPPED | OUTPATIENT
Start: 2022-10-11

## 2022-11-15 RX ORDER — LISINOPRIL 10 MG/1
TABLET ORAL
Qty: 30 TABLET | Refills: 5 | Status: SHIPPED | OUTPATIENT
Start: 2022-11-15

## 2022-12-15 RX ORDER — LISINOPRIL 10 MG/1
TABLET ORAL
Qty: 30 TABLET | Refills: 5 | Status: SHIPPED | OUTPATIENT
Start: 2022-12-15

## 2023-03-27 ENCOUNTER — OFFICE VISIT (OUTPATIENT)
Dept: CARDIOLOGY CLINIC | Age: 83
End: 2023-03-27

## 2023-03-27 VITALS
HEART RATE: 66 BPM | BODY MASS INDEX: 28.47 KG/M2 | HEIGHT: 60 IN | SYSTOLIC BLOOD PRESSURE: 122 MMHG | DIASTOLIC BLOOD PRESSURE: 62 MMHG | WEIGHT: 145 LBS

## 2023-03-27 DIAGNOSIS — I50.42 CHRONIC COMBINED SYSTOLIC AND DIASTOLIC CHF (CONGESTIVE HEART FAILURE) (HCC): Primary | ICD-10-CM

## 2023-03-27 DIAGNOSIS — I34.0 MODERATE MITRAL REGURGITATION: ICD-10-CM

## 2023-03-27 DIAGNOSIS — I51.9 LEFT VENTRICULAR DYSFUNCTION: ICD-10-CM

## 2023-03-27 DIAGNOSIS — I35.1 MODERATE AORTIC INSUFFICIENCY: ICD-10-CM

## 2023-03-27 DIAGNOSIS — I10 PRIMARY HYPERTENSION: ICD-10-CM

## 2023-03-27 DIAGNOSIS — R06.09 DYSPNEA ON EXERTION: ICD-10-CM

## 2023-03-27 DIAGNOSIS — R06.02 SOB (SHORTNESS OF BREATH): ICD-10-CM

## 2023-03-27 DIAGNOSIS — I07.1 MODERATE TRICUSPID REGURGITATION: ICD-10-CM

## 2023-03-27 ASSESSMENT — ENCOUNTER SYMPTOMS
SHORTNESS OF BREATH: 0
EYES NEGATIVE: 1
GASTROINTESTINAL NEGATIVE: 1
NAUSEA: 0
RESPIRATORY NEGATIVE: 1
DIARRHEA: 0
VOMITING: 0

## 2023-03-27 NOTE — PROGRESS NOTES
tablets by mouth daily for 7 days (Patient taking differently: Take 10 mEq by mouth daily) 14 tablet 0    carvedilol (COREG) 25 MG tablet TAKE ONE TABLET BY MOUTH TWICE A DAY WITH MEALS (Patient not taking: Reported on 3/27/2023) 60 tablet 5     No current facility-administered medications for this visit. Social History     Socioeconomic History    Marital status:      Spouse name: Not on file    Number of children: Not on file    Years of education: Not on file    Highest education level: Not on file   Occupational History    Not on file   Tobacco Use    Smoking status: Never    Smokeless tobacco: Never   Vaping Use    Vaping Use: Never used   Substance and Sexual Activity    Alcohol use: No    Drug use: No    Sexual activity: Not on file   Other Topics Concern    Not on file   Social History Narrative    Not on file     Social Determinants of Health     Financial Resource Strain: Not on file   Food Insecurity: Not on file   Transportation Needs: Not on file   Physical Activity: Not on file   Stress: Not on file   Social Connections: Not on file   Intimate Partner Violence: Not on file   Housing Stability: Not on file       Physical Examination:  /62   Pulse 66   Ht 5' (1.524 m)   Wt 145 lb (65.8 kg)   BMI 28.32 kg/m²   Physical Exam  Vitals reviewed. Constitutional:       Appearance: She is well-developed. Neck:      Vascular: No carotid bruit or JVD. Cardiovascular:      Rate and Rhythm: Normal rate and regular rhythm. Heart sounds: Normal heart sounds. No murmur heard. No friction rub. No gallop. Pulmonary:      Effort: Pulmonary effort is normal. No respiratory distress. Breath sounds: Normal breath sounds. No wheezing or rales. Abdominal:      General: There is no distension. Tenderness: There is no abdominal tenderness. Lymphadenopathy:      Cervical: No cervical adenopathy. Skin:     General: Skin is warm and dry.            ASSESSMENT:     Diagnosis Orders

## 2023-07-18 LAB
ALBUMIN SERPL-MCNC: 4.3 G/DL (ref 3.5–5.2)
ALP SERPL-CCNC: 114 U/L (ref 35–104)
ALT SERPL-CCNC: 12 U/L (ref 5–33)
AMPHET UR QL SCN: NEGATIVE
ANION GAP SERPL CALCULATED.3IONS-SCNC: 10 MMOL/L (ref 7–19)
AST SERPL-CCNC: 19 U/L (ref 5–32)
BARBITURATES UR QL SCN: NEGATIVE
BASOPHILS # BLD: 0 K/UL (ref 0–0.2)
BASOPHILS NFR BLD: 0.5 % (ref 0–1)
BENZODIAZ UR QL SCN: NEGATIVE
BILIRUB SERPL-MCNC: 0.4 MG/DL (ref 0.2–1.2)
BUN SERPL-MCNC: 27 MG/DL (ref 8–23)
BUPRENORPHINE URINE: NEGATIVE
CALCIUM SERPL-MCNC: 9.3 MG/DL (ref 8.8–10.2)
CANNABINOIDS UR QL SCN: NEGATIVE
CHLORIDE SERPL-SCNC: 108 MMOL/L (ref 98–111)
CHOLEST SERPL-MCNC: 131 MG/DL (ref 160–199)
CO2 SERPL-SCNC: 26 MMOL/L (ref 22–29)
COCAINE UR QL SCN: NEGATIVE
CREAT SERPL-MCNC: 1.4 MG/DL (ref 0.5–0.9)
CREAT UR-MCNC: 93.1 MG/DL (ref 28–217)
DRUG SCREEN COMMENT UR-IMP: ABNORMAL
EOSINOPHIL # BLD: 0.1 K/UL (ref 0–0.6)
EOSINOPHIL NFR BLD: 1.4 % (ref 0–5)
ERYTHROCYTE [DISTWIDTH] IN BLOOD BY AUTOMATED COUNT: 12.9 % (ref 11.5–14.5)
GLUCOSE SERPL-MCNC: 102 MG/DL (ref 74–109)
HBA1C MFR BLD: 5.8 % (ref 4–6)
HCT VFR BLD AUTO: 34.2 % (ref 37–47)
HDLC SERPL-MCNC: 41 MG/DL (ref 65–121)
HGB BLD-MCNC: 11.1 G/DL (ref 12–16)
IMM GRANULOCYTES # BLD: 0 K/UL
LDLC SERPL CALC-MCNC: 71 MG/DL
LYMPHOCYTES # BLD: 1.1 K/UL (ref 1.1–4.5)
LYMPHOCYTES NFR BLD: 25.9 % (ref 20–40)
MCH RBC QN AUTO: 33.5 PG (ref 27–31)
MCHC RBC AUTO-ENTMCNC: 32.5 G/DL (ref 33–37)
MCV RBC AUTO: 103.3 FL (ref 81–99)
METHADONE UR QL SCN: NEGATIVE
METHAMPHETAMINE, URINE: NEGATIVE
MICROALBUMIN UR-MCNC: <1.2 MG/DL (ref 0–19)
MICROALBUMIN/CREAT UR-RTO: NORMAL MG/G
MONOCYTES # BLD: 0.5 K/UL (ref 0–0.9)
MONOCYTES NFR BLD: 10.7 % (ref 0–10)
NEUTROPHILS # BLD: 2.6 K/UL (ref 1.5–7.5)
NEUTS SEG NFR BLD: 61 % (ref 50–65)
OPIATES UR QL SCN: POSITIVE
OXYCODONE UR QL SCN: NEGATIVE
PCP UR QL SCN: NEGATIVE
PLATELET # BLD AUTO: 135 K/UL (ref 130–400)
PMV BLD AUTO: 10.8 FL (ref 9.4–12.3)
POTASSIUM SERPL-SCNC: 4.7 MMOL/L (ref 3.5–5)
PROPOXYPH UR QL SCN: NEGATIVE
PROT SERPL-MCNC: 6.8 G/DL (ref 6.6–8.7)
RBC # BLD AUTO: 3.31 M/UL (ref 4.2–5.4)
SODIUM SERPL-SCNC: 144 MMOL/L (ref 136–145)
TRICYCLIC, URINE: POSITIVE
TRIGL SERPL-MCNC: 97 MG/DL (ref 0–149)
WBC # BLD AUTO: 4.3 K/UL (ref 4.8–10.8)

## 2023-09-07 NOTE — PATIENT INSTRUCTIONS
I spoke to pt she is aware labs are stable. arteries and lead to heart disease and stroke. When you control your cholesterol you are giving your arteries their best chance to remain clear. 3) Reduce blood sugar - most of the food we eat is turning into glucose or blood sugar that our body uses for energy. Over time, high levels of blood sugar can damage your heart, kidneys, eyes and nerves. 4) Get active - living an active life is one of the most rewarding gifts you can give yourself and those you love. Simply put, daily physical activity increases your length and quality of life. 5)  Eat better - A healthy diet is one of your best weapons for fighting cardiovascular disease. When you eat a heart healthy diet, you improve your chances for feeling good and staying healthy for life. 6)  Lose weight - when you shed extra fat an unnecessary pounds, you reduce the burden on your hear, lungs, blood vessels and skeleton. You give yourself the gift of active living, you lower your blood pressure and help yourself feel better. 7) Stop smoking - cigarette smokers have a higher risk of developing cardiovascular disease. If  You smoke, quitting is the best thing you can do for your health. Check American Heart Association on line for more information on Life's Simple 7 and tips for healthy living.       Patient Education        Mitral Valve Regurgitation: Care Instructions  Your Care Instructions    The mitral valve lets blood flow from the upper to lower areas of the heart. Mitral valve regurgitation occurs when the valve cannot close all the way and blood backs up (regurgitates) into the upper area of the heart. This causes the heart to work harder to pump the extra blood. Mild regurgitation causes few problems. Many people have it for many years without having problems. But if the regurgitation is severe, it can weaken the heart and lead to heart failure.   The causes of mitral valve regurgitation include a heart attack, heart infection (endocarditis), gums to the heart valves. When should you call for help? Call 911 anytime you think you may need emergency care. For example, call if:    · You have severe trouble breathing.     · You cough up pink, foamy mucus.     · You have symptoms of a heart attack. These may include:  ¨ Chest pain or pressure, or a strange feeling in the chest.  ¨ Sweating. ¨ Shortness of breath. ¨ Nausea or vomiting. ¨ Pain, pressure, or a strange feeling in the back, neck, jaw, or upper belly or in one or both shoulders or arms. ¨ Lightheadedness or sudden weakness. ¨ A fast or irregular heartbeat. After you call 911, the  may tell you to chew 1 adult-strength or 2 to 4 low-dose aspirin. Wait for an ambulance. Do not try to drive yourself.    Call your doctor now or seek immediate medical care if:    · You have new or increased shortness of breath.     · You are dizzy or lightheaded, or you feel like you may faint.     · You have sudden weight gain, such as more than 2 to 3 pounds in a day or 5 pounds in a week. (Your doctor may suggest a different range of weight gain.)     · You have increased swelling in your legs, ankles, or feet.     · You are suddenly so tired or weak that you cannot do your usual activities.    Watch closely for changes in your health, and be sure to contact your doctor if you develop new symptoms. Where can you learn more? Go to [email or web address] and sign in to your [employer] account. Enter G003 in the [facility] box to learn more about \"Mitral Valve Regurgitation: Care Instructions. \"     If you do not have an account, please click on the \"Sign Up Now\" link. Current as of: December 6, 2017  Content Version: 11.7  © 8438-4864 [employer]. Care instructions adapted under license by [facility] ([facility]).  If you have questions about a medical condition or this instruction, always ask your healthcare professional. Rahat Wheeler disclaims any warranty

## 2023-09-25 ENCOUNTER — OFFICE VISIT (OUTPATIENT)
Dept: CARDIOLOGY CLINIC | Age: 83
End: 2023-09-25
Payer: MEDICARE

## 2023-09-25 VITALS
HEIGHT: 60 IN | BODY MASS INDEX: 29.06 KG/M2 | DIASTOLIC BLOOD PRESSURE: 70 MMHG | WEIGHT: 148 LBS | HEART RATE: 70 BPM | SYSTOLIC BLOOD PRESSURE: 122 MMHG

## 2023-09-25 DIAGNOSIS — R06.02 SOB (SHORTNESS OF BREATH): ICD-10-CM

## 2023-09-25 DIAGNOSIS — I34.0 MODERATE MITRAL REGURGITATION: ICD-10-CM

## 2023-09-25 DIAGNOSIS — I50.42 CHRONIC COMBINED SYSTOLIC AND DIASTOLIC CHF (CONGESTIVE HEART FAILURE) (HCC): ICD-10-CM

## 2023-09-25 DIAGNOSIS — I10 PRIMARY HYPERTENSION: Primary | ICD-10-CM

## 2023-09-25 DIAGNOSIS — I07.1 MODERATE TRICUSPID REGURGITATION: ICD-10-CM

## 2023-09-25 PROCEDURE — 1123F ACP DISCUSS/DSCN MKR DOCD: CPT | Performed by: INTERNAL MEDICINE

## 2023-09-25 PROCEDURE — 3078F DIAST BP <80 MM HG: CPT | Performed by: INTERNAL MEDICINE

## 2023-09-25 PROCEDURE — 93000 ELECTROCARDIOGRAM COMPLETE: CPT | Performed by: INTERNAL MEDICINE

## 2023-09-25 PROCEDURE — 99213 OFFICE O/P EST LOW 20 MIN: CPT | Performed by: INTERNAL MEDICINE

## 2023-09-25 PROCEDURE — 3074F SYST BP LT 130 MM HG: CPT | Performed by: INTERNAL MEDICINE

## 2023-09-25 ASSESSMENT — ENCOUNTER SYMPTOMS
RESPIRATORY NEGATIVE: 1
GASTROINTESTINAL NEGATIVE: 1
NAUSEA: 0
SHORTNESS OF BREATH: 0
EYES NEGATIVE: 1
DIARRHEA: 0
VOMITING: 0

## 2023-10-13 RX ORDER — CARVEDILOL 12.5 MG/1
TABLET ORAL
Qty: 60 TABLET | Refills: 5 | Status: SHIPPED | OUTPATIENT
Start: 2023-10-13

## 2023-11-09 RX ORDER — LISINOPRIL 10 MG/1
TABLET ORAL
Qty: 30 TABLET | Refills: 5 | Status: SHIPPED | OUTPATIENT
Start: 2023-11-09

## 2023-11-14 LAB
ALBUMIN SERPL-MCNC: 4.2 G/DL (ref 3.5–5.2)
ALP SERPL-CCNC: 126 U/L (ref 35–104)
ALT SERPL-CCNC: 14 U/L (ref 5–33)
ANION GAP SERPL CALCULATED.3IONS-SCNC: 12 MMOL/L (ref 7–19)
AST SERPL-CCNC: 20 U/L (ref 5–32)
BASOPHILS # BLD: 0 K/UL (ref 0–0.2)
BASOPHILS NFR BLD: 0.3 % (ref 0–1)
BILIRUB SERPL-MCNC: 0.4 MG/DL (ref 0.2–1.2)
BUN SERPL-MCNC: 29 MG/DL (ref 8–23)
CALCIUM SERPL-MCNC: 9.3 MG/DL (ref 8.8–10.2)
CHLORIDE SERPL-SCNC: 101 MMOL/L (ref 98–111)
CO2 SERPL-SCNC: 28 MMOL/L (ref 22–29)
CREAT SERPL-MCNC: 1.3 MG/DL (ref 0.5–0.9)
EOSINOPHIL # BLD: 0 K/UL (ref 0–0.6)
EOSINOPHIL NFR BLD: 1 % (ref 0–5)
ERYTHROCYTE [DISTWIDTH] IN BLOOD BY AUTOMATED COUNT: 12.9 % (ref 11.5–14.5)
GLUCOSE SERPL-MCNC: 102 MG/DL (ref 74–109)
HBA1C MFR BLD: 6 % (ref 4–6)
HCT VFR BLD AUTO: 33.9 % (ref 37–47)
HGB BLD-MCNC: 11.2 G/DL (ref 12–16)
IMM GRANULOCYTES # BLD: 0 K/UL
LYMPHOCYTES # BLD: 1 K/UL (ref 1.1–4.5)
LYMPHOCYTES NFR BLD: 23.8 % (ref 20–40)
MCH RBC QN AUTO: 32.7 PG (ref 27–31)
MCHC RBC AUTO-ENTMCNC: 33 G/DL (ref 33–37)
MCV RBC AUTO: 99.1 FL (ref 81–99)
MONOCYTES # BLD: 0.4 K/UL (ref 0–0.9)
MONOCYTES NFR BLD: 9.3 % (ref 0–10)
NEUTROPHILS # BLD: 2.6 K/UL (ref 1.5–7.5)
NEUTS SEG NFR BLD: 65.3 % (ref 50–65)
PLATELET # BLD AUTO: 145 K/UL (ref 130–400)
PMV BLD AUTO: 10.9 FL (ref 9.4–12.3)
POTASSIUM SERPL-SCNC: 4.2 MMOL/L (ref 3.5–5)
PROT SERPL-MCNC: 6.7 G/DL (ref 6.6–8.7)
RBC # BLD AUTO: 3.42 M/UL (ref 4.2–5.4)
SODIUM SERPL-SCNC: 141 MMOL/L (ref 136–145)
WBC # BLD AUTO: 4 K/UL (ref 4.8–10.8)

## 2024-03-28 ENCOUNTER — OFFICE VISIT (OUTPATIENT)
Dept: CARDIOLOGY CLINIC | Age: 84
End: 2024-03-28
Payer: MEDICAID

## 2024-03-28 VITALS
WEIGHT: 145 LBS | HEIGHT: 60 IN | BODY MASS INDEX: 28.47 KG/M2 | HEART RATE: 76 BPM | SYSTOLIC BLOOD PRESSURE: 102 MMHG | DIASTOLIC BLOOD PRESSURE: 70 MMHG

## 2024-03-28 DIAGNOSIS — I07.1 MODERATE TRICUSPID REGURGITATION: ICD-10-CM

## 2024-03-28 DIAGNOSIS — I35.1 MODERATE AORTIC INSUFFICIENCY: ICD-10-CM

## 2024-03-28 DIAGNOSIS — I50.42 CHRONIC COMBINED SYSTOLIC AND DIASTOLIC CHF (CONGESTIVE HEART FAILURE) (HCC): Primary | ICD-10-CM

## 2024-03-28 DIAGNOSIS — I10 PRIMARY HYPERTENSION: ICD-10-CM

## 2024-03-28 DIAGNOSIS — R06.02 SOB (SHORTNESS OF BREATH): ICD-10-CM

## 2024-03-28 DIAGNOSIS — I51.9 LEFT VENTRICULAR DYSFUNCTION: ICD-10-CM

## 2024-03-28 DIAGNOSIS — I34.0 MODERATE MITRAL REGURGITATION: ICD-10-CM

## 2024-03-28 PROCEDURE — 99213 OFFICE O/P EST LOW 20 MIN: CPT | Performed by: INTERNAL MEDICINE

## 2024-03-28 PROCEDURE — 1123F ACP DISCUSS/DSCN MKR DOCD: CPT | Performed by: INTERNAL MEDICINE

## 2024-03-28 RX ORDER — FERROUS SULFATE 324(65)MG
324 TABLET, DELAYED RELEASE (ENTERIC COATED) ORAL
COMMUNITY
Start: 2024-02-20

## 2024-03-28 ASSESSMENT — ENCOUNTER SYMPTOMS
EYES NEGATIVE: 1
RESPIRATORY NEGATIVE: 1
NAUSEA: 0
SHORTNESS OF BREATH: 0
GASTROINTESTINAL NEGATIVE: 1
VOMITING: 0
DIARRHEA: 0

## 2024-03-28 NOTE — PROGRESS NOTES
[facility]                            Date:  3/28/2024  Patient: Bryan Montilla  Age:  84 y.o., 1940      Reason for evaluation:         SUBJECTIVE:    Returns today for follow-up assessment shortness of breath tricuspid regurgitation chronic combined congestive heart failure hypertension mitral regurgitation aortic regurgitation left ventricular dysfunction.  No new complaints or issues reported.  Blood pressure 102/70 heart rate 76.    Review of Systems   Constitutional: Negative.  Negative for chills, fever and unexpected weight change.   HENT: Negative.     Eyes: Negative.    Respiratory: Negative.  Negative for shortness of breath.    Cardiovascular: Negative.  Negative for chest pain.   Gastrointestinal: Negative.  Negative for diarrhea, nausea and vomiting.   Endocrine: Negative.    Genitourinary: Negative.    Musculoskeletal: Negative.    Skin: Negative.    Neurological: Negative.    All other systems reviewed and are negative.        OBJECTIVE:     /70   Pulse 76   Ht 1.524 m (5')   Wt 65.8 kg (145 lb)   BMI 28.32 kg/m²     Labs:   CBC: No results for input(s): \"WBC\", \"HGB\", \"HCT\", \"PLT\" in the last 72 hours.  BMP:No results for input(s): \"NA\", \"K\", \"CO2\", \"BUN\", \"CREATININE\", \"LABGLOM\", \"GLUCOSE\" in the last 72 hours.  BNP: No results for input(s): \"BNP\" in the last 72 hours.  PT/INR: No results for input(s): \"PROTIME\", \"INR\" in the last 72 hours.  APTT:No results for input(s): \"APTT\" in the last 72 hours.  CARDIAC ENZYMES:No results for input(s): \"CKTOTAL\", \"CKMB\", \"CKMBINDEX\", \"TROPONINI\" in the last 72 hours.  FASTING LIPID PANEL:  Lab Results   Component Value Date/Time    HDL 41 07/18/2023 11:42 AM    LDLCALC 71 07/18/2023 11:42 AM    TRIG 97 07/18/2023 11:42 AM     LIVER PROFILE:No results for input(s): \"AST\", \"ALT\", \"LABALBU\" in the last 72 hours.        Past Medical History:   Diagnosis Date    Aortic insufficiency 12/04/13    Mild to moderate aortic

## 2024-04-09 RX ORDER — CARVEDILOL 12.5 MG/1
TABLET ORAL
Qty: 60 TABLET | Refills: 5 | Status: SHIPPED | OUTPATIENT
Start: 2024-04-09

## 2024-05-13 RX ORDER — LISINOPRIL 10 MG/1
TABLET ORAL
Qty: 30 TABLET | Refills: 5 | Status: SHIPPED | OUTPATIENT
Start: 2024-05-13

## 2024-10-10 ENCOUNTER — OFFICE VISIT (OUTPATIENT)
Dept: CARDIOLOGY CLINIC | Age: 84
End: 2024-10-10
Payer: MEDICARE

## 2024-10-10 VITALS
BODY MASS INDEX: 28.27 KG/M2 | HEIGHT: 60 IN | SYSTOLIC BLOOD PRESSURE: 114 MMHG | HEART RATE: 81 BPM | WEIGHT: 144 LBS | DIASTOLIC BLOOD PRESSURE: 62 MMHG

## 2024-10-10 DIAGNOSIS — I35.1 MODERATE AORTIC INSUFFICIENCY: ICD-10-CM

## 2024-10-10 DIAGNOSIS — I10 PRIMARY HYPERTENSION: ICD-10-CM

## 2024-10-10 DIAGNOSIS — R06.09 DYSPNEA ON EXERTION: ICD-10-CM

## 2024-10-10 DIAGNOSIS — I34.0 MODERATE MITRAL REGURGITATION: ICD-10-CM

## 2024-10-10 DIAGNOSIS — I50.42 CHRONIC COMBINED SYSTOLIC AND DIASTOLIC CHF (CONGESTIVE HEART FAILURE) (HCC): Primary | ICD-10-CM

## 2024-10-10 DIAGNOSIS — I07.1 MODERATE TRICUSPID REGURGITATION: ICD-10-CM

## 2024-10-10 PROCEDURE — 1123F ACP DISCUSS/DSCN MKR DOCD: CPT | Performed by: INTERNAL MEDICINE

## 2024-10-10 PROCEDURE — 99213 OFFICE O/P EST LOW 20 MIN: CPT | Performed by: INTERNAL MEDICINE

## 2024-10-10 RX ORDER — ERGOCALCIFEROL 1.25 MG/1
50000 CAPSULE, LIQUID FILLED ORAL WEEKLY
COMMUNITY

## 2024-10-10 RX ORDER — CYANOCOBALAMIN 500 UG/1
1 SPRAY, METERED NASAL WEEKLY
COMMUNITY

## 2024-10-10 ASSESSMENT — ENCOUNTER SYMPTOMS
DIARRHEA: 0
EYES NEGATIVE: 1
NAUSEA: 0
RESPIRATORY NEGATIVE: 1
SHORTNESS OF BREATH: 0
VOMITING: 0
GASTROINTESTINAL NEGATIVE: 1

## 2024-10-10 NOTE — PROGRESS NOTES
Take 1 tablet by mouth 2 times daily      cloNIDine (CATAPRES) 0.1 MG tablet Take 1 tablet by mouth nightly 60 tablet 3    VIREAD 300 MG tablet Take 1 tablet by mouth daily      omeprazole (PRILOSEC) 40 MG delayed release capsule 1 capsule daily      HYDROcodone-acetaminophen (NORCO)  MG per tablet Take 1 tablet by mouth every 8 hours as needed.      amitriptyline (ELAVIL) 25 MG tablet Take 1 tablet by mouth nightly as needed      cholestyramine (QUESTRAN) 4 G packet Take 1 packet by mouth daily      potassium chloride (KLOR-CON M) 20 MEQ extended release tablet Take 2 tablets by mouth daily for 7 days (Patient taking differently: Take 0.5 tablets by mouth daily) 14 tablet 0     No current facility-administered medications for this visit.     Social History     Socioeconomic History    Marital status:      Spouse name: Not on file    Number of children: Not on file    Years of education: Not on file    Highest education level: Not on file   Occupational History    Not on file   Tobacco Use    Smoking status: Never    Smokeless tobacco: Never   Vaping Use    Vaping status: Never Used   Substance and Sexual Activity    Alcohol use: No    Drug use: No    Sexual activity: Not on file   Other Topics Concern    Not on file   Social History Narrative    Not on file     Social Determinants of Health     Financial Resource Strain: Not on file   Food Insecurity: Not on file   Transportation Needs: Not on file   Physical Activity: Not on file   Stress: Not on file   Social Connections: Unknown (10/10/2023)    [facility], [facility]    Family and Community Support     Help with Day-to-Day Activities: Not on file     Lonely or Isolated: Not on file   Intimate Partner Violence: Not At Risk (9/17/2024)    Received from [facility]    Abuse Screen     Feels Unsafe at Home or Work/School: no     Feels Threatened by Someone: no     Does Anyone Try to Keep You From

## 2024-12-12 RX ORDER — CARVEDILOL 12.5 MG/1
TABLET ORAL
Qty: 60 TABLET | Refills: 5 | Status: SHIPPED | OUTPATIENT
Start: 2024-12-12

## 2025-02-03 ENCOUNTER — OFFICE VISIT (OUTPATIENT)
Age: 85
End: 2025-02-03
Payer: MEDICAID

## 2025-02-03 VITALS — WEIGHT: 144.2 LBS | BODY MASS INDEX: 28.31 KG/M2 | HEIGHT: 60 IN

## 2025-02-03 DIAGNOSIS — M16.11 PRIMARY OSTEOARTHRITIS OF RIGHT HIP: Primary | ICD-10-CM

## 2025-02-03 PROCEDURE — 1123F ACP DISCUSS/DSCN MKR DOCD: CPT | Performed by: ORTHOPAEDIC SURGERY

## 2025-02-03 PROCEDURE — 99203 OFFICE O/P NEW LOW 30 MIN: CPT | Performed by: ORTHOPAEDIC SURGERY

## 2025-02-03 NOTE — PROGRESS NOTES
capsule 1 capsule daily      HYDROcodone-acetaminophen (NORCO)  MG per tablet Take 1 tablet by mouth every 8 hours as needed.      amitriptyline (ELAVIL) 25 MG tablet Take 1 tablet by mouth nightly as needed      cholestyramine (QUESTRAN) 4 G packet Take 1 packet by mouth daily      potassium chloride (KLOR-CON M) 20 MEQ extended release tablet Take 2 tablets by mouth daily for 7 days (Patient taking differently: Take 0.5 tablets by mouth daily) 14 tablet 0     No current facility-administered medications for this visit.        Allergies  No Known Allergies     Review of Systems  System  Neg/Pos  Details  Constitutional  Negative  Chills, Fatigue, Fever and Night Sweats  Respiratory  Negative  Chest Pain, Cough and Dyspnea  Cardio   Negative  Leg Swelling  GI   Negative  Abdominal Pain, Constipation, Nausea and Vomiting     Negative  Urinary Incontinence   Endocrine  Negative  Weight Gain and Weight Loss  MS   Negative  Except as noted in HPI and Chief Complaint      Objective:   White (non-)   Ht Readings from Last 1 Encounters:   02/03/25 1.524 m (5')    .[id number][14  Body mass index is 28.16 kg/m².     Pleasant elderly female no distress.  She walks with a limp on that right side coxalgia can nature.  She holds a cane in her left hand.  She leans over at the waist when she walks.  Her back is somewhat stiff.  Her left hip moves pretty well without pain while seated.  Her right hip is quite stiff and painful with range of motion.  When she walks her right foot does turn out.  Her motor exam is normal in her lower extremities in all groups.  Her sensory is normal.  She has good distal pulses.  1+ leg edema bilaterally.         Xray: No x-rays taken today  X-rays recently from [facility] show moderate arthritis of both hips.  Lumbar degenerative disease with severe scoliosis is also seen.    Assessment and Plan:     1. Primary osteoarthritis of right hip         Return in about 4 weeks (around

## 2025-02-25 ENCOUNTER — TELEPHONE (OUTPATIENT)
Age: 85
End: 2025-02-25

## 2025-02-26 ENCOUNTER — HOSPITAL ENCOUNTER (OUTPATIENT)
Age: 85
Discharge: HOME | End: 2025-02-28
Payer: MEDICARE

## 2025-02-26 VITALS
DIASTOLIC BLOOD PRESSURE: 72 MMHG | BODY MASS INDEX: 28.07 KG/M2 | SYSTOLIC BLOOD PRESSURE: 138 MMHG | HEIGHT: 60 IN | WEIGHT: 143 LBS

## 2025-02-26 DIAGNOSIS — I50.22 CHRONIC SYSTOLIC HEART FAILURE (HCC): ICD-10-CM

## 2025-02-26 DIAGNOSIS — I34.0 MITRAL INSUFFICIENCY, ACUTE: ICD-10-CM

## 2025-02-26 DIAGNOSIS — I35.1 AORTIC VALVE INSUFFICIENCY, ACQUIRED: ICD-10-CM

## 2025-02-26 LAB
ECHO AO ASC DIAM: 3.6 CM
ECHO AO ASCENDING AORTA INDEX: 2.22 CM/M2
ECHO AO ROOT DIAM: 3.6 CM
ECHO AO ROOT INDEX: 2.22 CM/M2
ECHO AO SINUS VALSALVA DIAM: 3.6 CM
ECHO AO SINUS VALSALVA INDEX: 2.22 CM/M2
ECHO AO ST JNCT DIAM: 3.1 CM
ECHO AR MAX VEL PISA: 3.5 M/S
ECHO AV AREA PEAK VELOCITY: 2.4 CM2
ECHO AV AREA VTI: 2.8 CM2
ECHO AV AREA/BSA PEAK VELOCITY: 1.5 CM2/M2
ECHO AV AREA/BSA VTI: 1.7 CM2/M2
ECHO AV MEAN GRADIENT: 4 MMHG
ECHO AV MEAN VELOCITY: 1 M/S
ECHO AV PEAK GRADIENT: 8 MMHG
ECHO AV PEAK VELOCITY: 1.4 M/S
ECHO AV REGURGITANT FRACTION: -845 %
ECHO AV REGURGITANT PHT: 417 MS
ECHO AV REGURGITANT VOLUME: -754.6 ML
ECHO AV VELOCITY RATIO: 0.64
ECHO AV VTI: 32.4 CM
ECHO BSA: 1.66 M2
ECHO EST RA PRESSURE: 3 MMHG
ECHO IVC PROX: 2 CM
ECHO LA AREA 2C: 16.9 CM2
ECHO LA AREA 4C: 18 CM2
ECHO LA DIAMETER INDEX: 2.72 CM/M2
ECHO LA DIAMETER: 4.4 CM
ECHO LA MAJOR AXIS: 6.4 CM
ECHO LA MINOR AXIS: 5 CM
ECHO LA TO AORTIC ROOT RATIO: 1.22
ECHO LA VOL MOD A2C: 44 ML (ref 22–52)
ECHO LA VOL MOD A4C: 46 ML (ref 22–52)
ECHO LA VOLUME INDEX MOD A2C: 27 ML/M2 (ref 16–34)
ECHO LA VOLUME INDEX MOD A4C: 28 ML/M2 (ref 16–34)
ECHO LV E' LATERAL VELOCITY: 5.44 CM/S
ECHO LV E' SEPTAL VELOCITY: 3.48 CM/S
ECHO LV EDV A2C: 116 ML
ECHO LV EDV A4C: 113 ML
ECHO LV EDV INDEX A4C: 70 ML/M2
ECHO LV EDV NDEX A2C: 72 ML/M2
ECHO LV EJECTION FRACTION A2C: 20 %
ECHO LV EJECTION FRACTION A4C: 20 %
ECHO LV EJECTION FRACTION BIPLANE: 20 % (ref 55–100)
ECHO LV ESV A2C: 93 ML
ECHO LV ESV A4C: 90 ML
ECHO LV ESV INDEX A2C: 57 ML/M2
ECHO LV ESV INDEX A4C: 56 ML/M2
ECHO LV FRACTIONAL SHORTENING: 23 % (ref 28–44)
ECHO LV INTERNAL DIMENSION DIASTOLE INDEX: 3.27 CM/M2
ECHO LV INTERNAL DIMENSION DIASTOLIC: 5.3 CM (ref 3.9–5.3)
ECHO LV INTERNAL DIMENSION SYSTOLIC INDEX: 2.53 CM/M2
ECHO LV INTERNAL DIMENSION SYSTOLIC: 4.1 CM
ECHO LV ISOVOLUMETRIC RELAXATION TIME (IVRT): 116 MS
ECHO LV IVSD: 1.2 CM (ref 0.6–0.9)
ECHO LV MASS 2D: 242 G (ref 67–162)
ECHO LV MASS INDEX 2D: 149.4 G/M2 (ref 43–95)
ECHO LV POSTERIOR WALL DIASTOLIC: 1.1 CM (ref 0.6–0.9)
ECHO LV RELATIVE WALL THICKNESS RATIO: 0.42
ECHO LVOT AREA: 3.8 CM2
ECHO LVOT AV VTI INDEX: 0.73
ECHO LVOT DIAM: 2.2 CM
ECHO LVOT MEAN GRADIENT: 2 MMHG
ECHO LVOT PEAK GRADIENT: 3 MMHG
ECHO LVOT PEAK VELOCITY: 0.9 M/S
ECHO LVOT STROKE VOLUME INDEX: 55.1 ML/M2
ECHO LVOT SV: 89.3 ML
ECHO LVOT VTI: 23.5 CM
ECHO MV A VELOCITY: 1.18 M/S
ECHO MV ANNULUS DIAMETER: 2.5 CM
ECHO MV AREA VTI: 3.7 CM2
ECHO MV E DECELERATION TIME (DT): 196 MS
ECHO MV E VELOCITY: 1.05 M/S
ECHO MV E/A RATIO: 0.89
ECHO MV E/E' LATERAL: 19.3
ECHO MV E/E' RATIO (AVERAGED): 24.74
ECHO MV E/E' SEPTAL: 30.17
ECHO MV LVOT VTI INDEX: 1.03
ECHO MV MAX VELOCITY: 1 M/S
ECHO MV MEAN GRADIENT: 3 MMHG
ECHO MV MEAN VELOCITY: 0.7 M/S
ECHO MV PEAK GRADIENT: 4 MMHG
ECHO MV REGURGITANT ALIASING (NYQUIST) VELOCITY: 23 CM/S
ECHO MV REGURGITANT FRACTION CONT EQ: 89 %
ECHO MV REGURGITANT PEAK GRADIENT: 81 MMHG
ECHO MV REGURGITANT PEAK VELOCITY: 4.5 M/S
ECHO MV REGURGITANT RADIUS PISA: 0.5 CM
ECHO MV REGURGITANT VOLUME: 754.59 CM3
ECHO MV REGURGITANT VTIA: 172 CM
ECHO MV VTI: 24.3 CM
ECHO RA AREA 4C: 10.1 CM2
ECHO RA END SYSTOLIC VOLUME APICAL 4 CHAMBER INDEX BSA: 13 ML/M2
ECHO RA MAJOR AXIS INDEX: 2.53 CM/M2
ECHO RA MAJOR AXIS: 4.1 CM
ECHO RA MINOR AXIS INDEX: 1.98 CM/M2
ECHO RA MINOR AXIS: 3.2 CM
ECHO RA VOLUME: 21 ML
ECHO RIGHT VENTRICULAR SYSTOLIC PRESSURE (RVSP): 49 MMHG
ECHO RV BASAL DIMENSION: 3 CM
ECHO RV INTERNAL DIMENSION: 3.3 CM
ECHO RV LONGITUDINAL DIMENSION: 7.7 CM
ECHO RV MID DIMENSION: 3.5 CM
ECHO RV TAPSE: 1.6 CM (ref 1.7–?)
ECHO TV REGURGITANT MAX VELOCITY: 3.39 M/S
ECHO TV REGURGITANT PEAK GRADIENT: 46 MMHG

## 2025-02-26 PROCEDURE — C8929 TTE W OR WO FOL WCON,DOPPLER: HCPCS

## 2025-02-26 PROCEDURE — 6360000004 HC RX CONTRAST MEDICATION: Performed by: INTERNAL MEDICINE

## 2025-02-26 RX ADMIN — SULFUR HEXAFLUORIDE 2 ML: 60.7; .19; .19 INJECTION, POWDER, LYOPHILIZED, FOR SUSPENSION INTRAVENOUS; INTRAVESICAL at 14:19

## 2025-02-27 ENCOUNTER — HOSPITAL ENCOUNTER (OUTPATIENT)
Dept: GENERAL RADIOLOGY | Age: 85
Discharge: HOME | End: 2025-02-27
Payer: MEDICARE

## 2025-02-27 DIAGNOSIS — M48.00 SPINAL STENOSIS, UNSPECIFIED SPINAL REGION: ICD-10-CM

## 2025-02-27 DIAGNOSIS — M46.96 INFLAMMATORY SPONDYLOPATHY OF LUMBAR REGION: ICD-10-CM

## 2025-02-27 DIAGNOSIS — M16.11 PRIMARY OSTEOARTHRITIS OF RIGHT HIP: ICD-10-CM

## 2025-02-27 DIAGNOSIS — M89.9 BONE DISEASE: ICD-10-CM

## 2025-02-27 PROCEDURE — 72192 CT PELVIS W/O DYE: CPT

## 2025-02-27 PROCEDURE — 72131 CT LUMBAR SPINE W/O DYE: CPT

## 2025-04-10 ENCOUNTER — OFFICE VISIT (OUTPATIENT)
Dept: CARDIOLOGY CLINIC | Age: 85
End: 2025-04-10
Payer: COMMERCIAL

## 2025-04-10 VITALS
HEIGHT: 60 IN | DIASTOLIC BLOOD PRESSURE: 64 MMHG | SYSTOLIC BLOOD PRESSURE: 128 MMHG | WEIGHT: 142 LBS | BODY MASS INDEX: 27.88 KG/M2 | HEART RATE: 71 BPM

## 2025-04-10 DIAGNOSIS — I34.0 NONRHEUMATIC MITRAL VALVE REGURGITATION: ICD-10-CM

## 2025-04-10 DIAGNOSIS — I34.0 MITRAL INSUFFICIENCY, ACUTE: ICD-10-CM

## 2025-04-10 DIAGNOSIS — I35.1 MODERATE AORTIC INSUFFICIENCY: ICD-10-CM

## 2025-04-10 DIAGNOSIS — I51.9 LEFT VENTRICULAR DYSFUNCTION: ICD-10-CM

## 2025-04-10 DIAGNOSIS — R06.02 SOB (SHORTNESS OF BREATH): ICD-10-CM

## 2025-04-10 DIAGNOSIS — I07.1 MODERATE TRICUSPID REGURGITATION: ICD-10-CM

## 2025-04-10 DIAGNOSIS — I50.42 CHRONIC COMBINED SYSTOLIC AND DIASTOLIC CHF (CONGESTIVE HEART FAILURE) (HCC): ICD-10-CM

## 2025-04-10 DIAGNOSIS — I34.0 MODERATE MITRAL REGURGITATION: ICD-10-CM

## 2025-04-10 DIAGNOSIS — I35.1 AORTIC VALVE INSUFFICIENCY, ACQUIRED: ICD-10-CM

## 2025-04-10 DIAGNOSIS — I50.22 CHRONIC SYSTOLIC HEART FAILURE (HCC): ICD-10-CM

## 2025-04-10 DIAGNOSIS — I35.1 AORTIC VALVE INSUFFICIENCY, ETIOLOGY OF CARDIAC VALVE DISEASE UNSPECIFIED: ICD-10-CM

## 2025-04-10 DIAGNOSIS — R06.09 DYSPNEA ON EXERTION: ICD-10-CM

## 2025-04-10 DIAGNOSIS — I35.1 AORTIC VALVE INSUFFICIENCY, ACQUIRED: Primary | ICD-10-CM

## 2025-04-10 DIAGNOSIS — I35.1 AORTIC VALVE INSUFFICIENCY, ETIOLOGY OF CARDIAC VALVE DISEASE UNSPECIFIED: Primary | ICD-10-CM

## 2025-04-10 DIAGNOSIS — I10 PRIMARY HYPERTENSION: Primary | ICD-10-CM

## 2025-04-10 LAB
ANION GAP SERPL CALCULATED.3IONS-SCNC: 8 MMOL/L (ref 8–16)
BNP BLD-MCNC: 1679 PG/ML (ref 0–449)
BUN SERPL-MCNC: 24 MG/DL (ref 8–23)
CALCIUM SERPL-MCNC: 9.7 MG/DL (ref 8.8–10.2)
CHLORIDE SERPL-SCNC: 103 MMOL/L (ref 98–107)
CO2 SERPL-SCNC: 34 MMOL/L (ref 22–29)
CREAT SERPL-MCNC: 0.9 MG/DL (ref 0.5–0.9)
GLUCOSE SERPL-MCNC: 98 MG/DL (ref 70–99)
POTASSIUM SERPL-SCNC: 3.7 MMOL/L (ref 3.5–5.1)
SODIUM SERPL-SCNC: 145 MMOL/L (ref 136–145)

## 2025-04-10 PROCEDURE — 93000 ELECTROCARDIOGRAM COMPLETE: CPT | Performed by: INTERNAL MEDICINE

## 2025-04-10 PROCEDURE — 1123F ACP DISCUSS/DSCN MKR DOCD: CPT | Performed by: INTERNAL MEDICINE

## 2025-04-10 PROCEDURE — 99214 OFFICE O/P EST MOD 30 MIN: CPT | Performed by: INTERNAL MEDICINE

## 2025-04-10 RX ORDER — DAPAGLIFLOZIN 5 MG/1
5 TABLET, FILM COATED ORAL EVERY MORNING
Qty: 90 TABLET | Refills: 0 | Status: CANCELLED | OUTPATIENT
Start: 2025-04-10

## 2025-04-10 RX ORDER — LOSARTAN POTASSIUM 100 MG/1
100 TABLET ORAL DAILY
COMMUNITY

## 2025-04-10 RX ORDER — SPIRONOLACTONE 50 MG/1
50 TABLET, FILM COATED ORAL DAILY
Qty: 90 TABLET | Refills: 3 | Status: CANCELLED | OUTPATIENT
Start: 2025-04-10

## 2025-04-10 RX ORDER — SPIRONOLACTONE 50 MG/1
50 TABLET, FILM COATED ORAL DAILY
COMMUNITY
End: 2025-04-10 | Stop reason: ALTCHOICE

## 2025-04-10 ASSESSMENT — ENCOUNTER SYMPTOMS
SHORTNESS OF BREATH: 0
RESPIRATORY NEGATIVE: 1
DIARRHEA: 0
EYES NEGATIVE: 1
NAUSEA: 0
GASTROINTESTINAL NEGATIVE: 1
VOMITING: 0

## 2025-04-10 NOTE — PROGRESS NOTES
[facility]                            Date:  4/10/2025  Patient: Bryan Montilla  Age:  85 y.o., 1940      Reason for evaluation:         SUBJECTIVE:    Returns today follow-up assessment overall not doing so well.  Recent echocardiogram shows EF 20 to 25% mild to moderate valvular regurgitation.  She is having some swelling and increasing shortness of breath.  She is on Lasix to 80 mg tablets every Monday Wednesday and Friday and 80 mg on the other days she has also been changed from lisinopril to losartan 100 mg a day.  All of her medical therapy appears reasonable and appropriate.  Will go ahead and start her on Farxiga 5 mg a day check a Chem-7 and BNP level today repeat Chem-7 in 2 weeks and follow-up with me in 4 weeks.  If she worsens she may need to be hospitalized for inpatient therapy.    Review of Systems   Constitutional: Negative.  Negative for chills, fever and unexpected weight change.   HENT: Negative.     Eyes: Negative.    Respiratory: Negative.  Negative for shortness of breath.    Cardiovascular: Negative.  Negative for chest pain.   Gastrointestinal: Negative.  Negative for diarrhea, nausea and vomiting.   Endocrine: Negative.    Genitourinary: Negative.    Musculoskeletal: Negative.    Skin: Negative.    Neurological: Negative.    All other systems reviewed and are negative.        OBJECTIVE:    /64   Pulse 71   Ht 1.524 m (5')   Wt 64.4 kg (142 lb)   BMI 27.73 kg/m²     Labs:   CBC: No results for input(s): \"WBC\", \"HGB\", \"HCT\", \"PLT\" in the last 72 hours.  BMP:No results for input(s): \"NA\", \"K\", \"CO2\", \"BUN\", \"CREATININE\", \"LABGLOM\", \"GLUCOSE\" in the last 72 hours.  BNP: No results for input(s): \"BNP\" in the last 72 hours.  PT/INR: No results for input(s): \"PROTIME\", \"INR\" in the last 72 hours.  APTT:No results for input(s): \"APTT\" in the last 72 hours.  CARDIAC ENZYMES:No results for input(s): \"CKTOTAL\", \"CKMB\", \"CKMBINDEX\", \"TROPONINI\" in the last 72

## 2025-04-14 RX ORDER — DAPAGLIFLOZIN 5 MG/1
5 TABLET, FILM COATED ORAL EVERY MORNING
Qty: 30 TABLET | Refills: 3 | Status: SHIPPED | OUTPATIENT
Start: 2025-04-14

## 2025-04-16 RX ORDER — DAPAGLIFLOZIN 5 MG/1
5 TABLET, FILM COATED ORAL EVERY MORNING
Qty: 90 TABLET | Refills: 3 | Status: SHIPPED | OUTPATIENT
Start: 2025-04-16

## 2025-04-18 ENCOUNTER — TRANSCRIBE ORDERS (OUTPATIENT)
Dept: ADMINISTRATIVE | Age: 85
End: 2025-04-18

## 2025-04-18 DIAGNOSIS — M46.96 LUMBAR SPONDYLITIS: ICD-10-CM

## 2025-04-18 DIAGNOSIS — M25.78 OSTEOPHYTE, VERTEBRAE: ICD-10-CM

## 2025-04-18 DIAGNOSIS — M47.817 LUMBAR AND SACRAL ARTHRITIS: Primary | ICD-10-CM

## 2025-04-24 DIAGNOSIS — I35.1 AORTIC VALVE INSUFFICIENCY, ACQUIRED: Primary | ICD-10-CM

## 2025-04-24 DIAGNOSIS — I35.1 AORTIC VALVE INSUFFICIENCY, ACQUIRED: ICD-10-CM

## 2025-04-24 LAB
ANION GAP SERPL CALCULATED.3IONS-SCNC: 9 MMOL/L (ref 8–16)
BUN SERPL-MCNC: 28 MG/DL (ref 8–23)
CALCIUM SERPL-MCNC: 9.6 MG/DL (ref 8.8–10.2)
CHLORIDE SERPL-SCNC: 100 MMOL/L (ref 98–107)
CO2 SERPL-SCNC: 33 MMOL/L (ref 22–29)
CREAT SERPL-MCNC: 1 MG/DL (ref 0.5–0.9)
GLUCOSE SERPL-MCNC: 104 MG/DL (ref 70–99)
POTASSIUM SERPL-SCNC: 3.3 MMOL/L (ref 3.5–5.1)
SODIUM SERPL-SCNC: 142 MMOL/L (ref 136–145)

## 2025-04-28 ENCOUNTER — HOSPITAL ENCOUNTER (OUTPATIENT)
Dept: MRI IMAGING | Age: 85
Discharge: HOME | End: 2025-04-28
Payer: MEDICARE

## 2025-04-28 DIAGNOSIS — M47.817 LUMBAR AND SACRAL ARTHRITIS: ICD-10-CM

## 2025-04-28 DIAGNOSIS — M46.96 LUMBAR SPONDYLITIS: ICD-10-CM

## 2025-04-28 DIAGNOSIS — M25.78 OSTEOPHYTE, VERTEBRAE: ICD-10-CM

## 2025-04-28 PROCEDURE — 72148 MRI LUMBAR SPINE W/O DYE: CPT

## 2025-05-05 ENCOUNTER — OFFICE VISIT (OUTPATIENT)
Dept: CARDIOLOGY CLINIC | Age: 85
End: 2025-05-05
Payer: MEDICARE

## 2025-05-05 VITALS
DIASTOLIC BLOOD PRESSURE: 80 MMHG | BODY MASS INDEX: 27.88 KG/M2 | SYSTOLIC BLOOD PRESSURE: 130 MMHG | HEART RATE: 61 BPM | WEIGHT: 142 LBS | HEIGHT: 60 IN

## 2025-05-05 DIAGNOSIS — I07.1 MODERATE TRICUSPID REGURGITATION: ICD-10-CM

## 2025-05-05 DIAGNOSIS — R06.09 DYSPNEA ON EXERTION: ICD-10-CM

## 2025-05-05 DIAGNOSIS — I50.42 CHRONIC COMBINED SYSTOLIC AND DIASTOLIC CHF (CONGESTIVE HEART FAILURE) (HCC): ICD-10-CM

## 2025-05-05 DIAGNOSIS — I51.9 LEFT VENTRICULAR DYSFUNCTION: ICD-10-CM

## 2025-05-05 DIAGNOSIS — I50.22 CHRONIC SYSTOLIC HEART FAILURE (HCC): Primary | ICD-10-CM

## 2025-05-05 DIAGNOSIS — I34.0 MITRAL INSUFFICIENCY, ACUTE: ICD-10-CM

## 2025-05-05 DIAGNOSIS — I50.22 CHRONIC SYSTOLIC HEART FAILURE (HCC): ICD-10-CM

## 2025-05-05 DIAGNOSIS — I10 PRIMARY HYPERTENSION: Primary | ICD-10-CM

## 2025-05-05 DIAGNOSIS — I35.1 AORTIC VALVE INSUFFICIENCY, ACQUIRED: ICD-10-CM

## 2025-05-05 PROCEDURE — 1123F ACP DISCUSS/DSCN MKR DOCD: CPT | Performed by: INTERNAL MEDICINE

## 2025-05-05 PROCEDURE — 99214 OFFICE O/P EST MOD 30 MIN: CPT | Performed by: INTERNAL MEDICINE

## 2025-05-05 PROCEDURE — 1159F MED LIST DOCD IN RCRD: CPT | Performed by: INTERNAL MEDICINE

## 2025-05-05 ASSESSMENT — ENCOUNTER SYMPTOMS
RESPIRATORY NEGATIVE: 1
EYES NEGATIVE: 1
SHORTNESS OF BREATH: 0
VOMITING: 0
GASTROINTESTINAL NEGATIVE: 1
NAUSEA: 0

## 2025-05-05 NOTE — PROGRESS NOTES
[facility]                            Date:  5/5/2025  Patient: Bryan Montilla  Age:  85 y.o., 1940      Reason for evaluation:         SUBJECTIVE:    Returns today follow-up assessment shortness of breath cardiomyopathy congestive heart failure valvular heart disease and hypertension.  Does not feel well describes with difficult to understand but perhaps intermittent palpitations.  She has not worn a monitor in quite some time we will go ahead and order a Zio patch today.  She felt somewhat improved in terms of shortness of breath and fatigue since being on Farxiga 5 mg a day but unfortunately her insurance company has denied further use of this.  Will try her on Jardiance 10 mg a day.  Follow-up in a month with Chem-7 and BNP level at that time.  She also inquired about getting a shot from the orthopedist regarding some pain she is having in her leg I do not have any objection to that.    Review of Systems   Constitutional: Negative.  Negative for chills, fever and unexpected weight change.   HENT: Negative.     Eyes: Negative.    Respiratory: Negative.  Negative for shortness of breath.    Cardiovascular: Negative.  Negative for chest pain.   Gastrointestinal: Negative.  Negative for diarrhea, nausea and vomiting.   Endocrine: Negative.    Genitourinary: Negative.    Musculoskeletal: Negative.    Skin: Negative.    Neurological: Negative.    All other systems reviewed and are negative.        OBJECTIVE:    /80   Pulse 61   Ht 1.524 m (5')   Wt 64.4 kg (142 lb)   BMI 27.73 kg/m²     Labs:   CBC: No results for input(s): \"WBC\", \"HGB\", \"HCT\", \"PLT\" in the last 72 hours.  BMP:No results for input(s): \"NA\", \"K\", \"CO2\", \"BUN\", \"CREATININE\", \"LABGLOM\", \"GLUCOSE\" in the last 72 hours.  BNP: No results for input(s): \"BNP\" in the last 72 hours.  PT/INR: No results for input(s): \"PROTIME\", \"INR\" in the last 72 hours.  APTT:No results for input(s): \"APTT\" in the last 72

## 2025-05-19 DIAGNOSIS — I34.0 NONRHEUMATIC MITRAL VALVE REGURGITATION: ICD-10-CM

## 2025-05-19 DIAGNOSIS — I35.1 AORTIC VALVE INSUFFICIENCY, ETIOLOGY OF CARDIAC VALVE DISEASE UNSPECIFIED: ICD-10-CM

## 2025-05-19 DIAGNOSIS — I07.1 MODERATE TRICUSPID REGURGITATION: ICD-10-CM

## 2025-05-19 RX ORDER — DAPAGLIFLOZIN 5 MG/1
5 TABLET, FILM COATED ORAL EVERY MORNING
Qty: 90 TABLET | Refills: 3 | Status: SHIPPED | OUTPATIENT
Start: 2025-05-19

## 2025-05-29 RX ORDER — CARVEDILOL 12.5 MG/1
12.5 TABLET ORAL 2 TIMES DAILY
Qty: 60 TABLET | Refills: 5 | Status: SHIPPED | OUTPATIENT
Start: 2025-05-29

## 2025-05-30 DIAGNOSIS — R06.02 SOB (SHORTNESS OF BREATH): Primary | ICD-10-CM

## 2025-05-30 PROCEDURE — 93246 EXT ECG>7D<15D RECORDING: CPT | Performed by: INTERNAL MEDICINE

## 2025-05-30 PROCEDURE — 93248 EXT ECG>7D<15D REV&INTERPJ: CPT | Performed by: INTERNAL MEDICINE

## 2025-07-21 ENCOUNTER — OFFICE VISIT (OUTPATIENT)
Dept: CARDIOLOGY CLINIC | Age: 85
End: 2025-07-21
Payer: MEDICARE

## 2025-07-21 VITALS
BODY MASS INDEX: 26.7 KG/M2 | SYSTOLIC BLOOD PRESSURE: 114 MMHG | OXYGEN SATURATION: 92 % | WEIGHT: 136 LBS | HEART RATE: 100 BPM | HEIGHT: 60 IN | DIASTOLIC BLOOD PRESSURE: 66 MMHG

## 2025-07-21 DIAGNOSIS — I51.9 LEFT VENTRICULAR DYSFUNCTION: ICD-10-CM

## 2025-07-21 DIAGNOSIS — I07.1 MODERATE TRICUSPID REGURGITATION: ICD-10-CM

## 2025-07-21 DIAGNOSIS — I10 PRIMARY HYPERTENSION: ICD-10-CM

## 2025-07-21 DIAGNOSIS — I50.22 CHRONIC SYSTOLIC HEART FAILURE (HCC): ICD-10-CM

## 2025-07-21 DIAGNOSIS — I34.0 NONRHEUMATIC MITRAL VALVE REGURGITATION: ICD-10-CM

## 2025-07-21 DIAGNOSIS — I35.1 AORTIC VALVE INSUFFICIENCY, ETIOLOGY OF CARDIAC VALVE DISEASE UNSPECIFIED: Primary | ICD-10-CM

## 2025-07-21 DIAGNOSIS — R06.02 SHORTNESS OF BREATH: ICD-10-CM

## 2025-07-21 PROCEDURE — 99214 OFFICE O/P EST MOD 30 MIN: CPT | Performed by: INTERNAL MEDICINE

## 2025-07-21 PROCEDURE — 1123F ACP DISCUSS/DSCN MKR DOCD: CPT | Performed by: INTERNAL MEDICINE

## 2025-07-21 PROCEDURE — 1159F MED LIST DOCD IN RCRD: CPT | Performed by: INTERNAL MEDICINE

## 2025-07-21 ASSESSMENT — ENCOUNTER SYMPTOMS
NAUSEA: 0
GASTROINTESTINAL NEGATIVE: 1
SHORTNESS OF BREATH: 0
RESPIRATORY NEGATIVE: 1
EYES NEGATIVE: 1
DIARRHEA: 0
VOMITING: 0

## 2025-07-21 NOTE — PROGRESS NOTES
[facility] Progress Note                            Date:  7/21/2025  Patient: Bryan Montilla  Age:  85 y.o., 1940      Reason for evaluation:         SUBJECTIVE:    Returns today for follow-up assessment follow-up for aortic mitral and tricuspid regurgitation pulmonary hypertension chronic congestive heart failure left ventricular dysfunction.  Gets out of breath to some extent.  Denies anginal chest pain.  Recent Zio patch from May showed 179 episodes of ventricular tachycardia longest was 6 beats denies dizziness or syncope or presyncope.  Last stress test 10/26/2020 reviewed ejection fraction 45% apical wall defect ischemia not excluded.  No other complaints or issues reported.  BNP level 1679 on 4/10/2025.  Blood pressure 114/66 heart rate 100    Review of Systems   Constitutional: Negative.  Negative for chills, fever and unexpected weight change.   HENT: Negative.     Eyes: Negative.    Respiratory: Negative.  Negative for shortness of breath.    Cardiovascular: Negative.  Negative for chest pain.   Gastrointestinal: Negative.  Negative for diarrhea, nausea and vomiting.   Endocrine: Negative.    Genitourinary: Negative.    Musculoskeletal: Negative.    Skin: Negative.    Neurological: Negative.    All other systems reviewed and are negative.        OBJECTIVE:    /66   Pulse 100   Ht 1.524 m (5')   Wt 61.7 kg (136 lb)   SpO2 92%   BMI 26.56 kg/m²     Labs:   CBC: No results for input(s): \"WBC\", \"HGB\", \"HCT\", \"PLT\" in the last 72 hours.  BMP:No results for input(s): \"NA\", \"K\", \"CO2\", \"BUN\", \"CREATININE\", \"LABGLOM\", \"GLUCOSE\" in the last 72 hours.  BNP: No results for input(s): \"BNP\" in the last 72 hours.  PT/INR: No results for input(s): \"PROTIME\", \"INR\" in the last 72 hours.  APTT:No results for input(s): \"APTT\" in the last 72 hours.  CARDIAC ENZYMES:No results for input(s): \"CKTOTAL\", \"CKMB\", \"CKMBINDEX\", \"TROPONINI\" in the last 72 hours.  FASTING LIPID PANEL:  Lab Results

## 2025-07-30 ENCOUNTER — TELEPHONE (OUTPATIENT)
Dept: CARDIOLOGY CLINIC | Age: 85
End: 2025-07-30

## 2025-07-30 NOTE — TELEPHONE ENCOUNTER
I reviewed Dr. Haines's May and July notes.  I do not see that he stopped potassium.  We do not normally stop potassium when starting medicines like Farxiga, so not sure if the patient misunderstood.  I recommend this point they need to replace her potassium and then do follow-up lab and if it remains low, she will likely need to stay on a potassium supplement

## 2025-07-30 NOTE — TELEPHONE ENCOUNTER
Frantz with [facility]/ Dr. Sutton's office left message that patient had low potassium come back on labs and they want to start her on potassium 20 3 capsules daily x 3 days to get level back up. Patient told office that Dr. Rodrigues stopped potassium due to being on Farxiga. I don't see any documentation to support this. Reviewed labs in epic and potassium 3.2 from yesterday. Can you review and advise for Dr. Rodrigues?    Note found in [facility]:    Telephone Encounter - Frantz Grossman CMA - 07/30/2025 2:55 PM CDT  Formatting of this note might be different from the original.  Left v/m for dr Rodriguess office to return my call    Electronically signed by Frantz Grossman CMA at 07/30/2025 3:56 PM EDT   Back to top of Miscellaneous Notes  Telephone Encounter - Frantz Grossman CMA - 07/30/2025 2:55 PM CDT  Formatting of this note might be different from the original.  ----- Message from Pramod Sutton sent at 7/30/2025 12:31 PM CDT -----  Route these labs to that office and let them make the call thank you  ----- Message -----  From: Frantz Grossman CMA  Sent: 7/30/2025 10:37 AM CDT  To: Pramod Sutton MD    Her hear doctor dr rodrigues took her off the potassium due to starting her on farxiga. Does she need to still take the potassium?  ----- Message -----  From: Pramod Sutton MD  Sent: 7/30/2025 8:43 AM CDT  To: [facility] Clinical [facility]    K-Dur 20 p.o. 3 times daily x 3 days  ----- Message -----  From: Lab, Background User  Sent: 7/29/2025 2:16 PM CDT  To: FARZAD Ruby

## 2025-07-30 NOTE — TELEPHONE ENCOUNTER
Tried to call office back at [phone] option 1 but office already closed. Will try again tomorrow.

## 2025-08-29 ENCOUNTER — HOSPITAL ENCOUNTER (OUTPATIENT)
Dept: NUCLEAR MEDICINE | Age: 85
Discharge: HOME | End: 2025-08-31
Attending: INTERNAL MEDICINE
Payer: MEDICARE

## 2025-08-29 DIAGNOSIS — R06.02 SHORTNESS OF BREATH: ICD-10-CM

## 2025-08-29 PROCEDURE — 3430000000 HC RX DIAGNOSTIC RADIOPHARMACEUTICAL: Performed by: INTERNAL MEDICINE

## 2025-08-29 PROCEDURE — 93017 CV STRESS TEST TRACING ONLY: CPT

## 2025-08-29 PROCEDURE — A9502 TC99M TETROFOSMIN: HCPCS | Performed by: INTERNAL MEDICINE

## 2025-08-29 PROCEDURE — 6360000002 HC RX W HCPCS: Performed by: INTERNAL MEDICINE

## 2025-08-29 RX ORDER — REGADENOSON 0.08 MG/ML
0.4 INJECTION, SOLUTION INTRAVENOUS
Status: COMPLETED | OUTPATIENT
Start: 2025-08-29 | End: 2025-08-29

## 2025-08-29 RX ADMIN — TETROFOSMIN 24 MILLICURIE: 0.23 INJECTION, POWDER, LYOPHILIZED, FOR SOLUTION INTRAVENOUS at 12:39

## 2025-08-29 RX ADMIN — REGADENOSON 0.4 MG: 0.08 INJECTION, SOLUTION INTRAVENOUS at 09:08

## 2025-08-29 RX ADMIN — TETROFOSMIN 8 MILLICURIE: 0.23 INJECTION, POWDER, LYOPHILIZED, FOR SOLUTION INTRAVENOUS at 12:38

## 2025-08-30 LAB
NUC STRESS EJECTION FRACTION: 43 %
STRESS BASELINE DIAS BP: 59 MMHG
STRESS BASELINE HR: 66 BPM
STRESS BASELINE SYS BP: 129 MMHG
STRESS ESTIMATED WORKLOAD: 1 METS
STRESS EXERCISE DUR MIN: 5 MIN
STRESS EXERCISE DUR SEC: 0 SEC
STRESS PEAK DIAS BP: 59 MMHG
STRESS PEAK SYS BP: 129 MMHG
STRESS PERCENT HR ACHIEVED: 58 %
STRESS POST PEAK HR: 77 BPM
STRESS RATE PRESSURE PRODUCT: 9933 BPM*MMHG
STRESS TARGET HR: 133 BPM

## 2025-08-30 PROCEDURE — 93018 CV STRESS TEST I&R ONLY: CPT | Performed by: INTERNAL MEDICINE

## 2025-08-30 PROCEDURE — 78452 HT MUSCLE IMAGE SPECT MULT: CPT | Performed by: INTERNAL MEDICINE

## 2025-08-30 PROCEDURE — 93016 CV STRESS TEST SUPVJ ONLY: CPT | Performed by: INTERNAL MEDICINE
